# Patient Record
Sex: FEMALE | Race: WHITE | HISPANIC OR LATINO | Employment: OTHER | URBAN - METROPOLITAN AREA
[De-identification: names, ages, dates, MRNs, and addresses within clinical notes are randomized per-mention and may not be internally consistent; named-entity substitution may affect disease eponyms.]

---

## 2017-02-01 ENCOUNTER — HOSPITAL ENCOUNTER (OUTPATIENT)
Dept: ULTRASOUND IMAGING | Facility: CLINIC | Age: 67
Discharge: HOME | End: 2017-02-01
Payer: OTHER GOVERNMENT

## 2017-02-01 DIAGNOSIS — R92.8 ABNORMAL MAMMOGRAM, UNSPECIFIED: ICD-10-CM

## 2017-02-01 PROCEDURE — 76642 ULTRASOUND BREAST LIMITED: CPT

## 2017-09-14 ENCOUNTER — TRANSCRIBE ORDERS (OUTPATIENT)
Dept: ADMINISTRATIVE | Facility: HOSPITAL | Age: 67
End: 2017-09-14

## 2017-09-14 DIAGNOSIS — Z12.31 VISIT FOR SCREENING MAMMOGRAM: Primary | ICD-10-CM

## 2018-02-07 ENCOUNTER — HOSPITAL ENCOUNTER (OUTPATIENT)
Dept: RADIOLOGY | Facility: HOSPITAL | Age: 68
Discharge: HOME | End: 2018-02-07
Payer: OTHER GOVERNMENT

## 2018-02-07 DIAGNOSIS — Z12.31 VISIT FOR SCREENING MAMMOGRAM: ICD-10-CM

## 2018-02-07 PROCEDURE — 77067 SCR MAMMO BI INCL CAD: CPT

## 2019-01-25 ENCOUNTER — TRANSCRIBE ORDERS (OUTPATIENT)
Dept: ADMINISTRATIVE | Facility: HOSPITAL | Age: 69
End: 2019-01-25

## 2019-01-25 DIAGNOSIS — Z12.31 VISIT FOR SCREENING MAMMOGRAM: Primary | ICD-10-CM

## 2019-03-01 ENCOUNTER — OFFICE VISIT (OUTPATIENT)
Dept: GASTROENTEROLOGY | Facility: CLINIC | Age: 69
End: 2019-03-01
Payer: OTHER GOVERNMENT

## 2019-03-01 VITALS
WEIGHT: 168.8 LBS | SYSTOLIC BLOOD PRESSURE: 126 MMHG | HEART RATE: 66 BPM | BODY MASS INDEX: 35.43 KG/M2 | HEIGHT: 58 IN | DIASTOLIC BLOOD PRESSURE: 85 MMHG | TEMPERATURE: 97.3 F

## 2019-03-01 DIAGNOSIS — Z12.11 SCREENING FOR MALIGNANT NEOPLASM OF COLON: Primary | ICD-10-CM

## 2019-03-01 PROCEDURE — 99203 OFFICE O/P NEW LOW 30 MIN: CPT | Performed by: INTERNAL MEDICINE

## 2019-03-01 RX ORDER — IBUPROFEN 400 MG/1
TABLET ORAL
COMMUNITY

## 2019-03-01 RX ORDER — LATANOPROST 50 UG/ML
1 SOLUTION/ DROPS OPHTHALMIC
COMMUNITY
End: 2019-03-01 | Stop reason: CLARIF

## 2019-03-01 RX ORDER — BRIMONIDINE TARTRATE 2 MG/ML
1 SOLUTION/ DROPS OPHTHALMIC
COMMUNITY

## 2019-03-01 RX ORDER — ELETRIPTAN HYDROBROMIDE 20 MG/1
20 TABLET, FILM COATED ORAL
Status: ON HOLD | COMMUNITY
End: 2019-04-02 | Stop reason: CLARIF

## 2019-03-01 RX ORDER — ACETAMINOPHEN 500 MG
500 TABLET ORAL
COMMUNITY
End: 2022-06-30

## 2019-03-01 RX ORDER — SODIUM, POTASSIUM,MAG SULFATES 17.5-3.13G
180 SOLUTION, RECONSTITUTED, ORAL ORAL ONCE
Qty: 180 ML | Refills: 0 | Status: ON HOLD | OUTPATIENT
Start: 2019-03-01 | End: 2019-04-02 | Stop reason: CLARIF

## 2019-03-01 NOTE — PROGRESS NOTES
[facility] - Outpatient Consultation  Willene Snellen 80 y o  female MRN: [id number]  Encounter: [id number]          ASSESSMENT AND PLAN:    1  Screening for colon cancer  Will schedule for colonoscopy at [facility]  All risks/benefits were discussed including bleeding, infection, perforation, and missed lesions  _________________________________________    HPI:  Willene Snellen is a 80y o  year old female who presents to the office for colon cancer screening evaluation  She had her last colonoscopy over 10 years ago at the [facility] in [place], [place]  She recalls it being a negative exam     Health otherwise she feels well  Medications she takes eyedrops for ocular hypertension  Just takes Vitamin D3 and a multivitamin and b12  Has not had a colonoscopy in the past    No blood in stool  No weight loss  No family history of colon cancer  No change in bowel habits  Not on any blood thinners  REVIEW OF SYSTEMS:    CONSTITUTIONAL: Denies any fever, chills, rigors, and weight loss  HEENT: No earache or tinnitus  Denies hearing loss or visual disturbances  CARDIOVASCULAR: No chest pain or palpitations  RESPIRATORY: Denies any cough, hemoptysis, shortness of breath or dyspnea on exertion  GASTROINTESTINAL: As noted in the History of Present Illness  GENITOURINARY: No problems with urination  Denies any hematuria or dysuria  NEUROLOGIC: No dizziness or vertigo, denies headaches  MUSCULOSKELETAL: Denies any muscle or joint pain  SKIN: Denies skin rashes or itching  ENDOCRINE: Denies excessive thirst  Denies intolerance to heat or cold  PSYCHOSOCIAL: Denies depression or anxiety  Denies any recent memory loss         Historical Information   Past Medical History:   Diagnosis Date    Anemia     Hyperthyroidism     Lymphoma ([address] )     pt stated 5 years remission    Osteoporosis      Past Surgical History:   Procedure Laterality Date    HYSTERECTOMY Social History   Social History     Substance and Sexual Activity   Alcohol Use Yes    Comment: oscional     Social History     Substance and Sexual Activity   Drug Use Never     Social History     Tobacco Use   Smoking Status Never Smoker   Smokeless Tobacco Never Used     Family History   Problem Relation Age of Onset    Cancer Mother     Hypertension Mother     Lung cancer Father     Glaucoma Father        Meds/Allergies     No current outpatient medications on file  Allergies   Allergen Reactions    Adhesive [Medical Tape]      Pt stated      Darvon [Propoxyphene]      Pt stated      Listerine Healthy White Gentle [Sodium Fluoride]      Pt stated listerine             Objective     Blood pressure 126/85, pulse 66, temperature (!) 97 3 °F (36 3 °C), temperature source Tympanic, height 4' 10" (1 473 m), weight 76 6 kg (168 lb 12 8 oz)  Body mass index is 35 28 kg/m²  PHYSICAL EXAM:      General Appearance:   Alert, cooperative, no distress   HEENT:   Normocephalic, atraumatic, anicteric      Neck:  Supple, symmetrical, trachea midline   Lungs:   Clear to auscultation bilaterally; no rales, rhonchi or wheezing; respirations unlabored    Heart[de-identified]   Regular rate and rhythm; no murmur, rub, or gallop  Abdomen:   Soft, non-tender, non-distended; normal bowel sounds; no masses, no organomegaly    Genitalia:   Deferred    Rectal:   Deferred    Extremities:  No cyanosis, clubbing or edema    Pulses:  2+ and symmetric    Skin:  No jaundice, rashes, or lesions    Lymph nodes:  No palpable cervical lymphadenopathy        Lab Results:   No visits with results within 1 Day(s) from this visit  Latest known visit with results is:   No results found for any previous visit  Radiology Results:   No results found

## 2019-03-01 NOTE — PATIENT INSTRUCTIONS
Colon scheduled 4/2/19 at [facility] with Betariz Conway instructions given in office

## 2019-03-04 ENCOUNTER — TELEPHONE (OUTPATIENT)
Dept: GASTROENTEROLOGY | Facility: AMBULARY SURGERY CENTER | Age: 69
End: 2019-03-04

## 2019-03-04 NOTE — TELEPHONE ENCOUNTER
[facility] [phone] fax#    Pharmacy called because the pharmacy do not carry the suprep,only the golytely

## 2019-03-05 DIAGNOSIS — Z12.11 COLON CANCER SCREENING: Primary | ICD-10-CM

## 2019-03-05 NOTE — TELEPHONE ENCOUNTER
We are unable to send prescriptions to the [address] [facility], I tried and apparently cannot call prescriptions into the [place] pharmacy  Please ask patient if we can send this to a different pharmacy, otherwise she needs to get a printed script from us and bring it to her [place] PCP

## 2019-03-06 DIAGNOSIS — Z12.11 COLON CANCER SCREENING: ICD-10-CM

## 2019-03-14 ENCOUNTER — HOSPITAL ENCOUNTER (OUTPATIENT)
Dept: RADIOLOGY | Facility: HOSPITAL | Age: 69
Discharge: HOME | End: 2019-03-14
Payer: OTHER GOVERNMENT

## 2019-03-14 VITALS — WEIGHT: 168 LBS | HEIGHT: 58 IN | BODY MASS INDEX: 35.26 KG/M2

## 2019-03-14 DIAGNOSIS — Z12.31 VISIT FOR SCREENING MAMMOGRAM: ICD-10-CM

## 2019-03-14 PROCEDURE — 77067 SCR MAMMO BI INCL CAD: CPT

## 2019-03-18 ENCOUNTER — ANESTHESIA EVENT (OUTPATIENT)
Dept: PERIOP | Facility: AMBULARY SURGERY CENTER | Age: 69
End: 2019-03-18
Payer: OTHER GOVERNMENT

## 2019-03-26 ENCOUNTER — TELEPHONE (OUTPATIENT)
Dept: GASTROENTEROLOGY | Facility: MEDICAL CENTER | Age: 69
End: 2019-03-26

## 2019-04-02 ENCOUNTER — ANESTHESIA (OUTPATIENT)
Dept: PERIOP | Facility: AMBULARY SURGERY CENTER | Age: 69
End: 2019-04-02
Payer: OTHER GOVERNMENT

## 2019-04-02 ENCOUNTER — HOSPITAL ENCOUNTER (OUTPATIENT)
Facility: AMBULARY SURGERY CENTER | Age: 69
Setting detail: OUTPATIENT SURGERY
Discharge: HOME | End: 2019-04-02
Attending: INTERNAL MEDICINE | Admitting: INTERNAL MEDICINE
Payer: OTHER GOVERNMENT

## 2019-04-02 VITALS
OXYGEN SATURATION: 97 % | RESPIRATION RATE: 18 BRPM | WEIGHT: 160 LBS | TEMPERATURE: 97.6 F | SYSTOLIC BLOOD PRESSURE: 119 MMHG | HEIGHT: 58 IN | BODY MASS INDEX: 33.58 KG/M2 | DIASTOLIC BLOOD PRESSURE: 56 MMHG | HEART RATE: 62 BPM

## 2019-04-02 DIAGNOSIS — Z12.11 SCREENING FOR MALIGNANT NEOPLASM OF COLON: ICD-10-CM

## 2019-04-02 PROCEDURE — ERR1 ERRONEOUS ENCOUNTER-DISREGARD: Performed by: INTERNAL MEDICINE

## 2019-04-02 PROCEDURE — 45385 COLONOSCOPY W/LESION REMOVAL: CPT | Performed by: INTERNAL MEDICINE

## 2019-04-02 PROCEDURE — 88305 TISSUE EXAM BY PATHOLOGIST: CPT | Performed by: PATHOLOGY

## 2019-04-02 RX ORDER — SODIUM CHLORIDE, SODIUM LACTATE, POTASSIUM CHLORIDE, CALCIUM CHLORIDE 600; 310; 30; 20 MG/100ML; MG/100ML; MG/100ML; MG/100ML
125 INJECTION, SOLUTION INTRAVENOUS CONTINUOUS
Status: DISCONTINUED | OUTPATIENT
Start: 2019-04-02 | End: 2019-04-02 | Stop reason: HOSPADM

## 2019-04-02 RX ORDER — MAG HYDROX/ALUMINUM HYD/SIMETH 400-400-40
1 SUSPENSION, ORAL (FINAL DOSE FORM) ORAL DAILY
COMMUNITY

## 2019-04-02 RX ORDER — PROPOFOL 10 MG/ML
INJECTION, EMULSION INTRAVENOUS AS NEEDED
Status: DISCONTINUED | OUTPATIENT
Start: 2019-04-02 | End: 2019-04-02 | Stop reason: SURG

## 2019-04-02 RX ORDER — MEPERIDINE HYDROCHLORIDE 25 MG/ML
12.5 INJECTION INTRAMUSCULAR; INTRAVENOUS; SUBCUTANEOUS AS NEEDED
Status: DISCONTINUED | OUTPATIENT
Start: 2019-04-02 | End: 2019-04-02 | Stop reason: HOSPADM

## 2019-04-02 RX ORDER — ALBUTEROL SULFATE 2.5 MG/3ML
2.5 SOLUTION RESPIRATORY (INHALATION) ONCE AS NEEDED
Status: DISCONTINUED | OUTPATIENT
Start: 2019-04-02 | End: 2019-04-02 | Stop reason: HOSPADM

## 2019-04-02 RX ORDER — DIPHENOXYLATE HYDROCHLORIDE AND ATROPINE SULFATE 2.5; .025 MG/1; MG/1
1 TABLET ORAL DAILY
COMMUNITY

## 2019-04-02 RX ORDER — SODIUM CHLORIDE 9 MG/ML
INJECTION, SOLUTION INTRAVENOUS CONTINUOUS PRN
Status: DISCONTINUED | OUTPATIENT
Start: 2019-04-02 | End: 2019-04-02 | Stop reason: SURG

## 2019-04-02 RX ORDER — ONDANSETRON 2 MG/ML
4 INJECTION INTRAMUSCULAR; INTRAVENOUS ONCE AS NEEDED
Status: DISCONTINUED | OUTPATIENT
Start: 2019-04-02 | End: 2019-04-02 | Stop reason: HOSPADM

## 2019-04-02 RX ADMIN — PROPOFOL 50 MG: 10 INJECTION, EMULSION INTRAVENOUS at 08:57

## 2019-04-02 RX ADMIN — PROPOFOL 50 MG: 10 INJECTION, EMULSION INTRAVENOUS at 09:02

## 2019-04-02 RX ADMIN — SODIUM CHLORIDE: 0.9 INJECTION, SOLUTION INTRAVENOUS at 08:27

## 2019-04-02 RX ADMIN — PROPOFOL 50 MG: 10 INJECTION, EMULSION INTRAVENOUS at 08:54

## 2020-03-09 ENCOUNTER — TRANSCRIBE ORDERS (OUTPATIENT)
Dept: ADMINISTRATIVE | Facility: HOSPITAL | Age: 70
End: 2020-03-09

## 2020-03-09 DIAGNOSIS — Z12.31 ENCOUNTER FOR SCREENING MAMMOGRAM FOR MALIGNANT NEOPLASM OF BREAST: Primary | ICD-10-CM

## 2020-07-16 ENCOUNTER — HOSPITAL ENCOUNTER (OUTPATIENT)
Dept: RADIOLOGY | Age: 70
Discharge: HOME | End: 2020-07-16
Payer: COMMERCIAL

## 2020-07-16 VITALS — BODY MASS INDEX: 35.26 KG/M2 | HEIGHT: 58 IN | WEIGHT: 168 LBS

## 2020-07-16 DIAGNOSIS — Z12.31 ENCOUNTER FOR SCREENING MAMMOGRAM FOR MALIGNANT NEOPLASM OF BREAST: ICD-10-CM

## 2020-07-16 PROCEDURE — 77067 SCR MAMMO BI INCL CAD: CPT

## 2020-07-16 PROCEDURE — 77063 BREAST TOMOSYNTHESIS BI: CPT

## 2020-07-22 ENCOUNTER — TRANSCRIBE ORDERS (OUTPATIENT)
Dept: ADMINISTRATIVE | Facility: HOSPITAL | Age: 70
End: 2020-07-22

## 2021-12-27 ENCOUNTER — HOSPITAL ENCOUNTER (OUTPATIENT)
Dept: RADIOLOGY | Age: 71
Discharge: HOME | End: 2021-12-27
Payer: COMMERCIAL

## 2021-12-27 VITALS — WEIGHT: 164 LBS | HEIGHT: 58 IN | BODY MASS INDEX: 34.43 KG/M2

## 2021-12-27 DIAGNOSIS — Z12.31 ENCOUNTER FOR SCREENING MAMMOGRAM FOR MALIGNANT NEOPLASM OF BREAST: ICD-10-CM

## 2021-12-27 PROCEDURE — 77067 SCR MAMMO BI INCL CAD: CPT

## 2021-12-27 PROCEDURE — 77063 BREAST TOMOSYNTHESIS BI: CPT

## 2022-06-27 ENCOUNTER — ANESTHESIA (INPATIENT)
Dept: PERIOP | Facility: HOSPITAL | Age: 72
DRG: 418 | End: 2022-06-27
Payer: COMMERCIAL

## 2022-06-27 ENCOUNTER — APPOINTMENT (INPATIENT)
Dept: RADIOLOGY | Facility: HOSPITAL | Age: 72
DRG: 418 | End: 2022-06-27
Payer: COMMERCIAL

## 2022-06-27 ENCOUNTER — ANESTHESIA EVENT (INPATIENT)
Dept: PERIOP | Facility: HOSPITAL | Age: 72
DRG: 418 | End: 2022-06-27
Payer: COMMERCIAL

## 2022-06-27 ENCOUNTER — APPOINTMENT (EMERGENCY)
Dept: ULTRASOUND IMAGING | Facility: HOSPITAL | Age: 72
DRG: 418 | End: 2022-06-27
Payer: COMMERCIAL

## 2022-06-27 ENCOUNTER — HOSPITAL ENCOUNTER (INPATIENT)
Facility: HOSPITAL | Age: 72
LOS: 3 days | Discharge: HOME | DRG: 418 | End: 2022-06-30
Attending: EMERGENCY MEDICINE | Admitting: SURGERY
Payer: COMMERCIAL

## 2022-06-27 ENCOUNTER — APPOINTMENT (EMERGENCY)
Dept: CT IMAGING | Facility: HOSPITAL | Age: 72
DRG: 418 | End: 2022-06-27
Payer: COMMERCIAL

## 2022-06-27 DIAGNOSIS — R10.9 ABDOMINAL PAIN: Primary | ICD-10-CM

## 2022-06-27 DIAGNOSIS — K81.9 CHOLECYSTITIS: ICD-10-CM

## 2022-06-27 DIAGNOSIS — K81.0 ACUTE CHOLECYSTITIS: ICD-10-CM

## 2022-06-27 DIAGNOSIS — K80.50 CHOLEDOCHOLITHIASIS: ICD-10-CM

## 2022-06-27 LAB
ALBUMIN SERPL BCP-MCNC: 4.2 G/DL (ref 3.5–5)
ALP SERPL-CCNC: 66 U/L (ref 34–104)
ALT SERPL W P-5'-P-CCNC: 18 U/L (ref 7–52)
ANION GAP SERPL CALCULATED.3IONS-SCNC: 16 MMOL/L (ref 4–13)
AST SERPL W P-5'-P-CCNC: 18 U/L (ref 13–39)
BASOPHILS # BLD AUTO: 0.1 THOUSANDS/ΜL (ref 0–0.1)
BASOPHILS NFR BLD AUTO: 0 % (ref 0–1)
BILIRUB SERPL-MCNC: 1.61 MG/DL (ref 0.2–1)
BUN SERPL-MCNC: 19 MG/DL (ref 5–25)
CALCIUM SERPL-MCNC: 9.5 MG/DL (ref 8.4–10.2)
CHLORIDE SERPL-SCNC: 102 MMOL/L (ref 96–108)
CO2 SERPL-SCNC: 19 MMOL/L (ref 21–32)
CREAT SERPL-MCNC: 0.75 MG/DL (ref 0.6–1.3)
EOSINOPHIL # BLD AUTO: 0.01 THOUSAND/ΜL (ref 0–0.61)
EOSINOPHIL NFR BLD AUTO: 0 % (ref 0–6)
ERYTHROCYTE [DISTWIDTH] IN BLOOD BY AUTOMATED COUNT: 14.2 % (ref 11.6–15.1)
GFR SERPL CREATININE-BSD FRML MDRD: 81 ML/MIN/1.73SQ M
GLUCOSE SERPL-MCNC: 140 MG/DL (ref 65–140)
HCT VFR BLD AUTO: 48.7 % (ref 34.8–46.1)
HGB BLD-MCNC: 16.2 G/DL (ref 11.5–15.4)
HOLD SPECIMEN: NORMAL
IMM GRANULOCYTES # BLD AUTO: 0.17 THOUSAND/UL (ref 0–0.2)
IMM GRANULOCYTES NFR BLD AUTO: 1 % (ref 0–2)
LIPASE SERPL-CCNC: <6 U/L (ref 11–82)
LYMPHOCYTES # BLD AUTO: 1.75 THOUSANDS/ΜL (ref 0.6–4.47)
LYMPHOCYTES NFR BLD AUTO: 7 % (ref 14–44)
MCH RBC QN AUTO: 29.8 PG (ref 26.8–34.3)
MCHC RBC AUTO-ENTMCNC: 33.3 G/DL (ref 31.4–37.4)
MCV RBC AUTO: 90 FL (ref 82–98)
MONOCYTES # BLD AUTO: 2.39 THOUSAND/ΜL (ref 0.17–1.22)
MONOCYTES NFR BLD AUTO: 10 % (ref 4–12)
NEUTROPHILS # BLD AUTO: 19.87 THOUSANDS/ΜL (ref 1.85–7.62)
NEUTS SEG NFR BLD AUTO: 82 % (ref 43–75)
NRBC BLD AUTO-RTO: 0 /100 WBCS
PLATELET # BLD AUTO: 336 THOUSANDS/UL (ref 149–390)
PMV BLD AUTO: 11.3 FL (ref 8.9–12.7)
POTASSIUM SERPL-SCNC: 3.8 MMOL/L (ref 3.5–5.3)
PROT SERPL-MCNC: 8 G/DL (ref 6.4–8.4)
RBC # BLD AUTO: 5.43 MILLION/UL (ref 3.81–5.12)
SODIUM SERPL-SCNC: 137 MMOL/L (ref 135–147)
WBC # BLD AUTO: 24.29 THOUSAND/UL (ref 4.31–10.16)

## 2022-06-27 PROCEDURE — 83690 ASSAY OF LIPASE: CPT | Performed by: EMERGENCY MEDICINE

## 2022-06-27 PROCEDURE — 99285 EMERGENCY DEPT VISIT HI MDM: CPT | Performed by: EMERGENCY MEDICINE

## 2022-06-27 PROCEDURE — 0FT44ZZ RESECTION OF GALLBLADDER, PERCUTANEOUS ENDOSCOPIC APPROACH: ICD-10-PCS | Performed by: SURGERY

## 2022-06-27 PROCEDURE — 88304 TISSUE EXAM BY PATHOLOGIST: CPT | Performed by: PATHOLOGY

## 2022-06-27 PROCEDURE — 36415 COLL VENOUS BLD VENIPUNCTURE: CPT

## 2022-06-27 PROCEDURE — 47562 LAPAROSCOPIC CHOLECYSTECTOMY: CPT | Performed by: SURGERY

## 2022-06-27 PROCEDURE — 76705 ECHO EXAM OF ABDOMEN: CPT

## 2022-06-27 PROCEDURE — G1004 CDSM NDSC: HCPCS

## 2022-06-27 PROCEDURE — 71045 X-RAY EXAM CHEST 1 VIEW: CPT

## 2022-06-27 PROCEDURE — 80053 COMPREHEN METABOLIC PANEL: CPT | Performed by: EMERGENCY MEDICINE

## 2022-06-27 PROCEDURE — 85025 COMPLETE CBC W/AUTO DIFF WBC: CPT | Performed by: EMERGENCY MEDICINE

## 2022-06-27 PROCEDURE — 96360 HYDRATION IV INFUSION INIT: CPT

## 2022-06-27 PROCEDURE — 99285 EMERGENCY DEPT VISIT HI MDM: CPT

## 2022-06-27 PROCEDURE — 99223 1ST HOSP IP/OBS HIGH 75: CPT | Performed by: SURGERY

## 2022-06-27 PROCEDURE — 74176 CT ABD & PELVIS W/O CONTRAST: CPT

## 2022-06-27 RX ORDER — ROCURONIUM BROMIDE 10 MG/ML
INJECTION, SOLUTION INTRAVENOUS AS NEEDED
Status: DISCONTINUED | OUTPATIENT
Start: 2022-06-27 | End: 2022-06-27

## 2022-06-27 RX ORDER — MEPERIDINE HYDROCHLORIDE 25 MG/ML
12.5 INJECTION INTRAMUSCULAR; INTRAVENOUS; SUBCUTANEOUS
Status: DISCONTINUED | OUTPATIENT
Start: 2022-06-27 | End: 2022-06-27

## 2022-06-27 RX ORDER — BUPIVACAINE HYDROCHLORIDE 2.5 MG/ML
INJECTION, SOLUTION EPIDURAL; INFILTRATION; INTRACAUDAL AS NEEDED
Status: DISCONTINUED | OUTPATIENT
Start: 2022-06-27 | End: 2022-06-27 | Stop reason: HOSPADM

## 2022-06-27 RX ORDER — DORZOLAMIDE HYDROCHLORIDE AND TIMOLOL MALEATE 20; 5 MG/ML; MG/ML
1 SOLUTION/ DROPS OPHTHALMIC 2 TIMES DAILY
Status: DISCONTINUED | OUTPATIENT
Start: 2022-06-27 | End: 2022-06-30 | Stop reason: HOSPADM

## 2022-06-27 RX ORDER — HYDROMORPHONE HCL IN WATER/PF 6 MG/30 ML
0.2 PATIENT CONTROLLED ANALGESIA SYRINGE INTRAVENOUS EVERY 4 HOURS PRN
Status: DISCONTINUED | OUTPATIENT
Start: 2022-06-27 | End: 2022-06-27

## 2022-06-27 RX ORDER — DEXAMETHASONE SODIUM PHOSPHATE 10 MG/ML
INJECTION, SOLUTION INTRAMUSCULAR; INTRAVENOUS AS NEEDED
Status: DISCONTINUED | OUTPATIENT
Start: 2022-06-27 | End: 2022-06-27

## 2022-06-27 RX ORDER — PROMETHAZINE HYDROCHLORIDE 25 MG/ML
12.5 INJECTION, SOLUTION INTRAMUSCULAR; INTRAVENOUS ONCE AS NEEDED
Status: DISCONTINUED | OUTPATIENT
Start: 2022-06-27 | End: 2022-06-27

## 2022-06-27 RX ORDER — HYDROMORPHONE HCL/PF 1 MG/ML
0.5 SYRINGE (ML) INJECTION
Status: DISCONTINUED | OUTPATIENT
Start: 2022-06-27 | End: 2022-06-27

## 2022-06-27 RX ORDER — METRONIDAZOLE 500 MG/100ML
500 INJECTION, SOLUTION INTRAVENOUS EVERY 8 HOURS
Status: DISCONTINUED | OUTPATIENT
Start: 2022-06-27 | End: 2022-06-27

## 2022-06-27 RX ORDER — INSULIN LISPRO 100 [IU]/ML
1-5 INJECTION, SOLUTION INTRAVENOUS; SUBCUTANEOUS
Status: DISCONTINUED | OUTPATIENT
Start: 2022-06-28 | End: 2022-06-30 | Stop reason: HOSPADM

## 2022-06-27 RX ORDER — ONDANSETRON 2 MG/ML
4 INJECTION INTRAMUSCULAR; INTRAVENOUS ONCE AS NEEDED
Status: DISCONTINUED | OUTPATIENT
Start: 2022-06-27 | End: 2022-06-27

## 2022-06-27 RX ORDER — PROPOFOL 10 MG/ML
INJECTION, EMULSION INTRAVENOUS CONTINUOUS PRN
Status: DISCONTINUED | OUTPATIENT
Start: 2022-06-27 | End: 2022-06-27

## 2022-06-27 RX ORDER — MAGNESIUM HYDROXIDE 1200 MG/15ML
LIQUID ORAL AS NEEDED
Status: DISCONTINUED | OUTPATIENT
Start: 2022-06-27 | End: 2022-06-27 | Stop reason: HOSPADM

## 2022-06-27 RX ORDER — HEPARIN SODIUM 5000 [USP'U]/ML
5000 INJECTION, SOLUTION INTRAVENOUS; SUBCUTANEOUS EVERY 8 HOURS SCHEDULED
Status: DISCONTINUED | OUTPATIENT
Start: 2022-06-27 | End: 2022-06-30 | Stop reason: HOSPADM

## 2022-06-27 RX ORDER — HYDROMORPHONE HCL/PF 1 MG/ML
SYRINGE (ML) INJECTION AS NEEDED
Status: DISCONTINUED | OUTPATIENT
Start: 2022-06-27 | End: 2022-06-27

## 2022-06-27 RX ORDER — LIDOCAINE HYDROCHLORIDE 10 MG/ML
INJECTION, SOLUTION EPIDURAL; INFILTRATION; INTRACAUDAL; PERINEURAL AS NEEDED
Status: DISCONTINUED | OUTPATIENT
Start: 2022-06-27 | End: 2022-06-27

## 2022-06-27 RX ORDER — PROPOFOL 10 MG/ML
INJECTION, EMULSION INTRAVENOUS AS NEEDED
Status: DISCONTINUED | OUTPATIENT
Start: 2022-06-27 | End: 2022-06-27

## 2022-06-27 RX ORDER — CEFAZOLIN SODIUM 2 G/50ML
2000 SOLUTION INTRAVENOUS EVERY 8 HOURS
Status: DISCONTINUED | OUTPATIENT
Start: 2022-06-27 | End: 2022-06-27

## 2022-06-27 RX ORDER — ONDANSETRON 2 MG/ML
4 INJECTION INTRAMUSCULAR; INTRAVENOUS EVERY 6 HOURS PRN
Status: DISCONTINUED | OUTPATIENT
Start: 2022-06-27 | End: 2022-06-30 | Stop reason: HOSPADM

## 2022-06-27 RX ORDER — OXYCODONE HYDROCHLORIDE 10 MG/1
10 TABLET ORAL EVERY 4 HOURS PRN
Status: DISCONTINUED | OUTPATIENT
Start: 2022-06-27 | End: 2022-06-30 | Stop reason: HOSPADM

## 2022-06-27 RX ORDER — HYDROMORPHONE HCL/PF 1 MG/ML
0.5 SYRINGE (ML) INJECTION EVERY 4 HOURS PRN
Status: DISCONTINUED | OUTPATIENT
Start: 2022-06-27 | End: 2022-06-30 | Stop reason: HOSPADM

## 2022-06-27 RX ORDER — CEFAZOLIN SODIUM 2 G/50ML
2000 SOLUTION INTRAVENOUS EVERY 8 HOURS
Status: DISCONTINUED | OUTPATIENT
Start: 2022-06-27 | End: 2022-06-28

## 2022-06-27 RX ORDER — OXYCODONE HYDROCHLORIDE 5 MG/1
5 TABLET ORAL EVERY 4 HOURS PRN
Status: DISCONTINUED | OUTPATIENT
Start: 2022-06-27 | End: 2022-06-30 | Stop reason: HOSPADM

## 2022-06-27 RX ORDER — ALBUTEROL SULFATE 2.5 MG/3ML
2.5 SOLUTION RESPIRATORY (INHALATION) ONCE AS NEEDED
Status: DISCONTINUED | OUTPATIENT
Start: 2022-06-27 | End: 2022-06-27

## 2022-06-27 RX ORDER — SUCCINYLCHOLINE/SOD CL,ISO/PF 100 MG/5ML
SYRINGE (ML) INTRAVENOUS AS NEEDED
Status: DISCONTINUED | OUTPATIENT
Start: 2022-06-27 | End: 2022-06-27

## 2022-06-27 RX ORDER — ACETAMINOPHEN 325 MG/1
975 TABLET ORAL EVERY 8 HOURS SCHEDULED
Status: DISCONTINUED | OUTPATIENT
Start: 2022-06-27 | End: 2022-06-30 | Stop reason: HOSPADM

## 2022-06-27 RX ORDER — ONDANSETRON 2 MG/ML
INJECTION INTRAMUSCULAR; INTRAVENOUS AS NEEDED
Status: DISCONTINUED | OUTPATIENT
Start: 2022-06-27 | End: 2022-06-27

## 2022-06-27 RX ORDER — BRIMONIDINE TARTRATE 2 MG/ML
1 SOLUTION/ DROPS OPHTHALMIC EVERY 8 HOURS SCHEDULED
Status: DISCONTINUED | OUTPATIENT
Start: 2022-06-27 | End: 2022-06-30 | Stop reason: HOSPADM

## 2022-06-27 RX ORDER — FENTANYL CITRATE 50 UG/ML
INJECTION, SOLUTION INTRAMUSCULAR; INTRAVENOUS AS NEEDED
Status: DISCONTINUED | OUTPATIENT
Start: 2022-06-27 | End: 2022-06-27

## 2022-06-27 RX ORDER — FENTANYL CITRATE/PF 50 MCG/ML
25 SYRINGE (ML) INJECTION
Status: DISCONTINUED | OUTPATIENT
Start: 2022-06-27 | End: 2022-06-27

## 2022-06-27 RX ORDER — METRONIDAZOLE 500 MG/100ML
500 INJECTION, SOLUTION INTRAVENOUS EVERY 8 HOURS
Status: DISCONTINUED | OUTPATIENT
Start: 2022-06-27 | End: 2022-06-28

## 2022-06-27 RX ORDER — LEVOTHYROXINE SODIUM 0.05 MG/1
50 TABLET ORAL
Status: DISCONTINUED | OUTPATIENT
Start: 2022-06-28 | End: 2022-06-30 | Stop reason: HOSPADM

## 2022-06-27 RX ORDER — SODIUM CHLORIDE, SODIUM LACTATE, POTASSIUM CHLORIDE, CALCIUM CHLORIDE 600; 310; 30; 20 MG/100ML; MG/100ML; MG/100ML; MG/100ML
50 INJECTION, SOLUTION INTRAVENOUS CONTINUOUS
Status: DISCONTINUED | OUTPATIENT
Start: 2022-06-27 | End: 2022-06-29

## 2022-06-27 RX ORDER — SODIUM CHLORIDE, SODIUM LACTATE, POTASSIUM CHLORIDE, CALCIUM CHLORIDE 600; 310; 30; 20 MG/100ML; MG/100ML; MG/100ML; MG/100ML
INJECTION, SOLUTION INTRAVENOUS CONTINUOUS PRN
Status: DISCONTINUED | OUTPATIENT
Start: 2022-06-27 | End: 2022-06-27

## 2022-06-27 RX ADMIN — CEFAZOLIN SODIUM 2000 MG: 2 SOLUTION INTRAVENOUS at 23:37

## 2022-06-27 RX ADMIN — ACETAMINOPHEN 975 MG: 325 TABLET, FILM COATED ORAL at 21:18

## 2022-06-27 RX ADMIN — SODIUM CHLORIDE, SODIUM LACTATE, POTASSIUM CHLORIDE, AND CALCIUM CHLORIDE 125 ML/HR: .6; .31; .03; .02 INJECTION, SOLUTION INTRAVENOUS at 23:27

## 2022-06-27 RX ADMIN — DORZOLAMIDE HYDROCHLORIDE AND TIMOLOL MALEATE 1 DROP: 20; 5 SOLUTION/ DROPS OPHTHALMIC at 21:15

## 2022-06-27 RX ADMIN — BRIMONIDINE TARTRATE 1 DROP: 2 SOLUTION/ DROPS OPHTHALMIC at 22:00

## 2022-06-27 RX ADMIN — METRONIDAZOLE: 500 INJECTION, SOLUTION INTRAVENOUS at 17:15

## 2022-06-27 RX ADMIN — LIDOCAINE HYDROCHLORIDE 50 MG: 10 INJECTION, SOLUTION EPIDURAL; INFILTRATION; INTRACAUDAL at 17:07

## 2022-06-27 RX ADMIN — PROPOFOL 130 MG: 10 INJECTION, EMULSION INTRAVENOUS at 17:07

## 2022-06-27 RX ADMIN — PROPOFOL 80 MCG/KG/MIN: 10 INJECTION, EMULSION INTRAVENOUS at 17:12

## 2022-06-27 RX ADMIN — SUGAMMADEX 200 MG: 100 INJECTION, SOLUTION INTRAVENOUS at 19:19

## 2022-06-27 RX ADMIN — ROCURONIUM BROMIDE 15 MG: 10 INJECTION, SOLUTION INTRAVENOUS at 18:51

## 2022-06-27 RX ADMIN — ROCURONIUM BROMIDE 50 MG: 10 INJECTION, SOLUTION INTRAVENOUS at 17:12

## 2022-06-27 RX ADMIN — SODIUM CHLORIDE, SODIUM LACTATE, POTASSIUM CHLORIDE, AND CALCIUM CHLORIDE: .6; .31; .03; .02 INJECTION, SOLUTION INTRAVENOUS at 17:04

## 2022-06-27 RX ADMIN — PHENYLEPHRINE HYDROCHLORIDE 40 MCG/MIN: 10 INJECTION INTRAVENOUS at 17:12

## 2022-06-27 RX ADMIN — SODIUM CHLORIDE 1000 ML: 0.9 INJECTION, SOLUTION INTRAVENOUS at 12:18

## 2022-06-27 RX ADMIN — HEPARIN SODIUM 5000 UNITS: 5000 INJECTION INTRAVENOUS; SUBCUTANEOUS at 21:20

## 2022-06-27 RX ADMIN — HYDROMORPHONE HYDROCHLORIDE 0.5 MG: 1 INJECTION, SOLUTION INTRAMUSCULAR; INTRAVENOUS; SUBCUTANEOUS at 18:29

## 2022-06-27 RX ADMIN — DEXAMETHASONE SODIUM PHOSPHATE 10 MG: 10 INJECTION, SOLUTION INTRAMUSCULAR; INTRAVENOUS at 17:12

## 2022-06-27 RX ADMIN — ONDANSETRON 4 MG: 2 INJECTION INTRAMUSCULAR; INTRAVENOUS at 17:07

## 2022-06-27 RX ADMIN — Medication 100 MG: at 17:07

## 2022-06-27 RX ADMIN — CEFAZOLIN SODIUM 2000 MG: 2 SOLUTION INTRAVENOUS at 17:15

## 2022-06-27 RX ADMIN — FENTANYL CITRATE 100 MCG: 50 INJECTION, SOLUTION INTRAMUSCULAR; INTRAVENOUS at 17:07

## 2022-06-27 RX ADMIN — SODIUM CHLORIDE, SODIUM LACTATE, POTASSIUM CHLORIDE, AND CALCIUM CHLORIDE: .6; .31; .03; .02 INJECTION, SOLUTION INTRAVENOUS at 17:56

## 2022-06-27 RX ADMIN — HYDROMORPHONE HYDROCHLORIDE 0.5 MG: 1 INJECTION, SOLUTION INTRAMUSCULAR; INTRAVENOUS; SUBCUTANEOUS at 19:21

## 2022-06-27 NOTE — H&P
H&P Exam - Acute Care Surgery   Javier Lowery 80 y o  female MRN: [id number]  Unit/Bed#: [place] [address] Encounter: [id number]    Assessment/Plan     Assessment:  71-year-old female with 2 days of abdominal pain consistent w acute cholecystitis  Vss  Afebrile  Abdomen is severe tenderness in the right upper quadrant  Pertinent labs  WBC 24  AST/ALT 18/18  Alkaline phosphatase 66  Total bilirubin 1 6    Plan:  Admit to surgery service  IV antibiotics Ancef/Flagyl  IV fluids, lactated Ringer's at 125cc/h  Pain control  To operating room for laparoscopic cholecystectomy pending that there are no common bile duct stones on right upper quadrant ultrasound    History of Present Illness   History, ROS and PFSH unobtainable from any source due to none  HPI:  Javier Lowery is a 80 y o  female past medical history of lymphoma, prediabetes, anemia, hyperlipidemia surgical history of total abdominal hysterectomy oophorectomy, appendectomy, 30 years ago, who presents with remote history of biliary colic  However patient reports that she has developed severe sharp right upper quadrant pain that is been persistent for the past 2 days  Movement makes the pain worse  She has not eaten in 2 days  Endorsed intermittent fever and chills  Denied any current chest pain or shortness of breath  Denied history of MI or stroke  She does not take any anticoagulation or anti-platelet  Denied history of blood clots  Review of Systems   Constitutional: Positive for chills and fever  HENT: Negative  Eyes: Positive for discharge  Respiratory: Negative  Cardiovascular: Negative  Gastrointestinal: Negative  Endocrine: Negative  Genitourinary: Negative  Musculoskeletal: Negative  Skin: Negative  Allergic/Immunologic: Negative  Neurological: Negative  Hematological: Negative  Psychiatric/Behavioral: Negative          Historical Information   Past Medical History:   Diagnosis Date    Abnormal results of thyroid function studies     Age-related osteoporosis without current pathological fracture     Anemia     Diffuse large B-cell lymphoma (HCC)     diffuse large B-cell lymphoma unspecified sit    Edema     History of chemotherapy 10/01/2013    Hyperthyroidism     Impaired fasting glucose     Lymphoma ([facility] )     pt stated 5 years remission    Mixed hyperlipidemia     Non Hodgkin's lymphoma ([facility] ) 07/01/2013    unspecified, unspecified sit    Nuclear age-related cataract, both eyes     Obesity     Ocular hypertension, bilateral     Prediabetes     Puckering of macula, left eye     Tremor     unspecified     Past Surgical History:   Procedure Laterality Date    APPENDECTOMY      HIP FRACTURE SURGERY      HYSTERECTOMY      @ age 40    OOPHORECTOMY      @age 40    CA COLONOSCOPY FLX DX W/COLLJ SPEC WHEN PFRMD N/A 4/2/2019    Procedure: COLONOSCOPY;  Surgeon: Eva Young MD;  Location: [facility]; Service: Gastroenterology     Social History   Social History     Substance and Sexual Activity   Alcohol Use Yes    Comment: oscional     Social History     Substance and Sexual Activity   Drug Use Never     Social History     Tobacco Use   Smoking Status Never Smoker   Smokeless Tobacco Never Used     E-Cigarette/Vaping     E-Cigarette/Vaping Substances     Family History: non-contributory    Meds/Allergies   all current active meds have been reviewed  Allergies   Allergen Reactions    Adhesive [Medical Tape]      Pt stated      Darvon [Propoxyphene]      Pt stated      Listerine Antiseptic [Antiseptic Mouth Rinse]     Listerine Healthy White Gentle [Sodium Fluoride]      Pt stated listerine, listermint, with fluoride           Objective   Vitals: Blood pressure 121/75, pulse 89, temperature 98 1 °F (36 7 °C), temperature source Oral, resp  rate 16, height 4' 10" (1 473 m), weight 72 6 kg (160 lb), SpO2 97 %, not currently breastfeeding  ,Body mass index is 33 44 kg/m²    No intake or output data in the 24 hours ending 06/27/22 1542  Invasive Devices  Report    Peripheral Intravenous Line  Duration           Peripheral IV 06/27/22 Left Antecubital <1 day                Physical Exam  Vitals reviewed  Constitutional:       Appearance: Normal appearance  HENT:      Head: Normocephalic and atraumatic  Nose: Nose normal       Mouth/Throat:      Mouth: Mucous membranes are moist    Eyes:      Pupils: Pupils are equal, round, and reactive to light  Cardiovascular:      Rate and Rhythm: Normal rate  Pulses: Normal pulses  Pulmonary:      Effort: Pulmonary effort is normal    Abdominal:      General: There is distension  Palpations: Abdomen is soft  Tenderness: There is abdominal tenderness  There is no guarding  Genitourinary:     Comments: deferred  Musculoskeletal:         General: Normal range of motion  Cervical back: Normal range of motion  Skin:     General: Skin is warm  Capillary Refill: Capillary refill takes 2 to 3 seconds  Neurological:      Mental Status: She is alert  Psychiatric:         Mood and Affect: Mood normal          Lab Results:   I have personally reviewed pertinent reports    , Coags: No results found for: PT, PTT, INR, Creatinine:   Lab Results   Component Value Date    CREATININE 0 75 06/27/2022   , Lipid Panel: No results found for: CHOL, CBC with diff:   Lab Results   Component Value Date    WBC 24 29 (H) 06/27/2022    HGB 16 2 (H) 06/27/2022    HCT 48 7 (H) 06/27/2022    MCV 90 06/27/2022     06/27/2022    MCH 29 8 06/27/2022    MCHC 33 3 06/27/2022    RDW 14 2 06/27/2022    MPV 11 3 06/27/2022    NRBC 0 06/27/2022   , BMP/CMP:   Lab Results   Component Value Date    SODIUM 137 06/27/2022    K 3 8 06/27/2022     06/27/2022    CO2 19 (L) 06/27/2022    BUN 19 06/27/2022    CREATININE 0 75 06/27/2022    CALCIUM 9 5 06/27/2022    AST 18 06/27/2022    ALT 18 06/27/2022    ALKPHOS 66 06/27/2022    EGFR 81 06/27/2022   , Coags: No results found for: PT, PTT, INR, CRP: No results found for: CRP  Imaging: I have personally reviewed pertinent reports  EKG, Pathology, and Other Studies: I have personally reviewed pertinent reports  Code Status: Level 1 - Full Code  Advance Directive and Living Will:      Power of :    POLST:      Counseling / Coordination of Care  Counseling/Coordination of Care: Total floor / unit time spent today 30 minutes  Greater than 50% of total time was spent with the patient and / or family counseling and / or coordination of care   A description of the counseling / coordination of care: 30

## 2022-06-27 NOTE — ED PROVIDER NOTES
History  Chief Complaint   Patient presents with    Abdominal Pain     Pt reports lower abd pain x3 days, states started in RUQ but now only in b/l lower abd  Worse when laying on stomach  Denies NVD/fevers      HPI     67-year-old female presents emergency department for evaluation of right upper quadrant pain that has now extended to her lower abdomen  Worse when lying flat  Denies any nausea, vomiting, diarrhea  Denies any fever  Denies any dysuria  Reports history of hysterectomy, denies additional intra-abdominal operation  Reports diminished oral intake over the past several days  Prior to Admission Medications   Prescriptions Last Dose Informant Patient Reported? Taking?    B Complex Vitamins (VITAMIN B COMPLEX PO)  Outside Facility (Specify) Yes No   Sig: Take by mouth   Cholecalciferol (VITAMIN D3) 5000 units CAPS   Yes No   Sig: Take 1 capsule by mouth daily   Denosumab (PROLIA SC)   Yes No   Sig: Inject under the skin   Dorzolamide HCl-Timolol Mal PF 22 3-6 8 MG/ML SOLN  Outside Facility (Specify) Yes No   Sig: Apply to eye   GLUCOSAMINE-CHONDROITIN-MSM PO  Outside Facility (Specify) Yes No   Sig: Take by mouth   Levothyroxine Sodium (SYNTHROID PO)  Outside Facility (Specify) Yes No   Sig: Take 0 05 mg by mouth   acetaminophen (TYLENOL) 500 mg tablet  Outside Facility (Specify) Yes No   Sig: Take 500 mg by mouth   brimonidine tartrate 0 2 % ophthalmic solution  Outside Facility (Specify) Yes No   Si drop   ibuprofen (MOTRIN) 400 mg tablet  Outside Facility (Specify) Yes No   Sig: Take by mouth   multivitamin (THERAGRAN) TABS   Yes No   Sig: Take 1 tablet by mouth daily      Facility-Administered Medications: None       Past Medical History:   Diagnosis Date    Abnormal results of thyroid function studies     Age-related osteoporosis without current pathological fracture     Anemia     Diffuse large B-cell lymphoma (HCC)     diffuse large B-cell lymphoma unspecified sit    Edema     History of chemotherapy 10/01/2013    Hyperthyroidism     Impaired fasting glucose     [facility] )     pt stated 5 years remission    Mixed hyperlipidemia     Non Hodgkin's lymphoma ([facility] ) 07/01/2013    unspecified, unspecified sit    Nuclear age-related cataract, both eyes     Obesity     Ocular hypertension, bilateral     Prediabetes     Puckering of macula, left eye     Tremor     unspecified       Past Surgical History:   Procedure Laterality Date    APPENDECTOMY      HIP FRACTURE SURGERY      HYSTERECTOMY      @ age 40    OOPHORECTOMY      @age 40    OR COLONOSCOPY FLX DX W/COLLJ SPEC WHEN PFRMD N/A 4/2/2019    Procedure: COLONOSCOPY;  Surgeon: Hunter Hendricks MD;  Location: [facility]; Service: Gastroenterology       Family History   Problem Relation Age of Onset    Cancer Mother         ? esophageal    Hypertension Mother     Lung cancer Father     Glaucoma Father     Breast cancer Maternal Aunt 79    Stomach cancer Sister     Liver cancer Sister     No Known Problems Maternal Grandmother     No Known Problems Maternal Grandfather     No Known Problems Paternal Grandmother     No Known Problems Paternal Grandfather     Thyroid cancer Sister     No Known Problems Sister     No Known Problems Sister     No Known Problems Paternal Aunt      I have reviewed and agree with the history as documented  E-Cigarette/Vaping     E-Cigarette/Vaping Substances     Social History     Tobacco Use    Smoking status: Never Smoker    Smokeless tobacco: Never Used   Substance Use Topics    Alcohol use: Yes     Comment: oscional    Drug use: Never       Review of Systems   Gastrointestinal: Positive for abdominal pain, diarrhea and nausea  Negative for blood in stool and constipation  All other systems reviewed and are negative  Physical Exam  Physical Exam  Vitals and nursing note reviewed  Constitutional:       General: She is not in acute distress       Appearance: She is well-developed  HENT:      Head: Normocephalic and atraumatic  Eyes:      Pupils: Pupils are equal, round, and reactive to light  Cardiovascular:      Rate and Rhythm: Normal rate and regular rhythm  Heart sounds: Normal heart sounds  No murmur heard  Pulmonary:      Effort: Pulmonary effort is normal  No respiratory distress  Breath sounds: Normal breath sounds  No wheezing or rales  Abdominal:      General: Bowel sounds are normal  There is no distension  Palpations: Abdomen is soft  Tenderness: There is abdominal tenderness in the right upper quadrant  There is no guarding or rebound  Positive signs include Huerta's sign  Musculoskeletal:         General: No deformity  Normal range of motion  Cervical back: Normal range of motion and neck supple  Lymphadenopathy:      Cervical: No cervical adenopathy  Skin:     Capillary Refill: Capillary refill takes less than 2 seconds  Findings: No erythema or rash  Neurological:      Mental Status: She is alert and oriented to person, place, and time  Cranial Nerves: No cranial nerve deficit  Motor: No abnormal muscle tone        Coordination: Coordination normal    Psychiatric:         Behavior: Behavior normal          Vital Signs  ED Triage Vitals [06/27/22 1157]   Temperature Pulse Respirations Blood Pressure SpO2   98 1 °F (36 7 °C) 89 16 121/75 97 %      Temp Source Heart Rate Source Patient Position - Orthostatic VS BP Location FiO2 (%)   Oral Monitor Sitting Left arm --      Pain Score       3           Vitals:    06/27/22 1157   BP: 121/75   Pulse: 89   Patient Position - Orthostatic VS: Sitting         Visual Acuity      ED Medications  Medications   brimonidine tartrate 0 2 % ophthalmic solution 1 drop (has no administration in time range)   dorzolamide-timolol (COSOPT) 22 3-6 8 MG/ML ophthalmic solution 1 drop (has no administration in time range)   levothyroxine tablet 50 mcg (has no administration in time range)   lactated ringers infusion (has no administration in time range)   ondansetron (ZOFRAN) injection 4 mg (has no administration in time range)   heparin (porcine) subcutaneous injection 5,000 Units (has no administration in time range)   HYDROmorphone (DILAUDID) injection 0 5 mg (has no administration in time range)   HYDROmorphone HCl (DILAUDID) injection 0 2 mg (has no administration in time range)   ceFAZolin (ANCEF) IVPB (premix in dextrose) 2,000 mg 50 mL (has no administration in time range)   metroNIDAZOLE (FLAGYL) IVPB (premix) 500 mg 100 mL (has no administration in time range)   sodium chloride 0 9 % bolus 1,000 mL (0 mL Intravenous Stopped 6/27/22 1318)       Diagnostic Studies  Results Reviewed     Procedure Component Value Units Date/Time    [place] draw [[id number]] Collected: 06/27/22 1216    Lab Status: Final result Specimen: Blood from Arm, Left Updated: 06/27/22 1403    Narrative: The following orders were created for panel order [place] draw  Procedure                               Abnormality         Status                     ---------                               -----------         ------                     Mere Lo Top on [id number][[id number]]                           Final result               Gold top on RFDB[[id number]]                                 Final result               Green / Black tube on BZXY[[id number]]                       Final result                 Please view results for these tests on the individual orders      Comprehensive metabolic panel [[id number]]  (Abnormal) Collected: 06/27/22 1216    Lab Status: Final result Specimen: Blood from Arm, Left Updated: 06/27/22 1323     Sodium 137 mmol/L      Potassium 3 8 mmol/L      Chloride 102 mmol/L      CO2 19 mmol/L      ANION GAP 16 mmol/L      BUN 19 mg/dL      Creatinine 0 75 mg/dL      Glucose 140 mg/dL      Calcium 9 5 mg/dL      AST 18 U/L      ALT 18 U/L      Alkaline Phosphatase 66 U/L      Total Protein 8 0 g/dL      Albumin 4 2 g/dL      Total Bilirubin 1 61 mg/dL      eGFR 81 ml/min/1 73sq m     Narrative:      [place] guidelines for Chronic Kidney Disease (CKD):     Stage 1 with normal or high GFR (GFR > 90 mL/min/1 73 square meters)    Stage 2 Mild CKD (GFR = 60-89 mL/min/1 73 square meters)    Stage 3A Moderate CKD (GFR = 45-59 mL/min/1 73 square meters)    Stage 3B Moderate CKD (GFR = 30-44 mL/min/1 73 square meters)    Stage 4 Severe CKD (GFR = 15-29 mL/min/1 73 square meters)    Stage 5 End Stage CKD (GFR <15 mL/min/1 73 square meters)  Note: GFR calculation is accurate only with a steady state creatinine    Lipase [[id number]]  (Abnormal) Collected: 06/27/22 1216    Lab Status: Final result Specimen: Blood from Arm, Left Updated: 06/27/22 1323     Lipase <6 u/L     CBC and differential [[id number]]  (Abnormal) Collected: 06/27/22 1216    Lab Status: Final result Specimen: Blood from Arm, Left Updated: 06/27/22 1231     WBC 24 29 Thousand/uL      RBC 5 43 Million/uL      Hemoglobin 16 2 g/dL      Hematocrit 48 7 %      MCV 90 fL      MCH 29 8 pg      MCHC 33 3 g/dL      RDW 14 2 %      MPV 11 3 fL      Platelets 780 Thousands/uL      nRBC 0 /100 WBCs      Neutrophils Relative 82 %      Immat GRANS % 1 %      Lymphocytes Relative 7 %      Monocytes Relative 10 %      Eosinophils Relative 0 %      Basophils Relative 0 %      Neutrophils Absolute 19 87 Thousands/µL      Immature Grans Absolute 0 17 Thousand/uL      Lymphocytes Absolute 1 75 Thousands/µL      Monocytes Absolute 2 39 Thousand/µL      Eosinophils Absolute 0 01 Thousand/µL      Basophils Absolute 0 10 Thousands/µL                  US right upper quadrant   Final Result by David Alexandra MD (06/27 1600)      Gallstones and secondary signs of cholecystitis  No evidence of choledocholithiasis        Workstation performed: [id number]         CT abdomen pelvis wo contrast   Final Result by Jed Pelayo MD (06/27 5)      Cholelithiasis with CT findings consistent with acute cholecystitis  No biliary ductal dilatation  The study was marked in [facility] for immediate notification  Workstation performed: [id number]         XR chest portable    (Results Pending)              Procedures  Procedures         ED Course                                             MDM  Number of Diagnoses or Management Options  Abdominal pain: new and requires workup  Cholecystitis: new and requires workup  Diagnosis management comments: Right upper quadrant tenderness, positive Huerta sign  CT scan concerning for acute cholecystitis, patient with leukocytosis  Right upper quadrant ultrasound ordered, general surgery consult, saw patient emergency department admitted to the service  Patient hemodynamically stable  Patient made NPO following diagnosis of cholecystitis         Amount and/or Complexity of Data Reviewed  Clinical lab tests: ordered and reviewed  Tests in the radiology section of CPT®: ordered and reviewed  Tests in the medicine section of CPT®: ordered and reviewed  Discuss the patient with other providers: yes    Risk of Complications, Morbidity, and/or Mortality  Presenting problems: high  Diagnostic procedures: high  Management options: high    Patient Progress  Patient progress: stable      Disposition  Final diagnoses:   Abdominal pain   Cholecystitis     Time reflects when diagnosis was documented in both MDM as applicable and the Disposition within this note     Time User Action Codes Description Comment    6/27/2022  3:01 PM Tk Johns Add [R10 9] Abdominal pain     6/27/2022  3:01 PM Tk Johns Add [K81 9] Cholecystitis     6/27/2022  3:24 PM Mirta Corley Add [K81 0] Acute cholecystitis       ED Disposition     ED Disposition   Admit    Condition   Stable    Date/Time   Mon Jun 27, 2022  3:01 PM    Comment   Case was discussed with Bettie Carmen PA-C and the patient's admission status was agreed to be Admission Status: inpatient status to the service of Dr Migue Rojas   Follow-up Information    None         Patient's Medications   Discharge Prescriptions    No medications on file       No discharge procedures on file      PDMP Review     None          ED Provider  Electronically Signed by           Milagros Clark MD  06/27/22 2129

## 2022-06-27 NOTE — ANESTHESIA POSTPROCEDURE EVALUATION
Post-Op Assessment Note    CV Status:  Stable    Pain management: adequate     Mental Status:  Lethargic and sleepy   Hydration Status:  Stable   PONV Controlled:  None   Airway Patency:  Patent      Post Op Vitals Reviewed: Yes      Staff: CRNA         No complications documented      BP   119/56   Temp   98 7   Pulse  95   Resp   18   SpO2   97

## 2022-06-27 NOTE — OP NOTE
OPERATIVE REPORT  PATIENT NAME: Margarita Hagan    :  1950  MRN: [id number]  Pt Location: [facility] OR ROOM 03    SURGERY DATE: 2022    Surgeon(s) and Role:     Umang Bernabe DO - Primary     * Emiliano Doran MD - Assisting     * Adalberto Patel MD - Assisting    Preop Diagnosis:  Acute cholecystitis [K81 0]    Post-Op Diagnosis Codes:     * Acute cholecystitis [K81 0]    Procedure(s) (LRB):  CHOLECYSTECTOMY LAPAROSCOPIC (N/A)    Specimen(s):  ID Type Source Tests Collected by Time Destination   1 :  Tissue Gallbladder TISSUE EXAM Caesar BernabeDO 2022 1856        Estimated Blood Loss:   100 mL    Drains:  * No LDAs found *    Anesthesia Type:   General    Operative Indications:  Acute cholecystitis [K81 0]    Operative Findings:  Severely inflamed gallbladder with omental adhesions  Severe inflammation of hepatocystic triangle, transection of cystic artery excised while obtaining the critical view  Cystic artery was triply clipped proximally  Cystic duct identified, triply clipped and excised  Complications:   None    Procedure and Technique:  Patient was identified in the preoperative holding area via wrist band, and taken to the operating room in stable condition  Again identified verbally and via wrist band, laid supine on the operating table, both arms were extended, pressure points were padded, abdomen was prepped and draped in the regular sterile fashion, time-out was called all are in agreement  Attention was drawn just superior to the umbilicus, generous amount of local anesthesia was injected subcutaneously and down to the fashion we will like fashion  10 mm transverse incision was made, subcutaneous tissues were bluntly dissected with Kocher forceps, fascia was grasped and retracted anteriorly  Stab incision was made in the fascia with 11 blade scalpel, 0 Vicryl stay sutures were placed and 12 mm trocar was introduced into the abdomen    Patient was placed in steep reverse Trendelenburg with right side up, a 12 mm subxiphoid, in 2 assistant 5 mm ports were placed in the right upper quadrant under direct vision  Upon entry into the abdomen, we identified that there was severe inflammation of the gallbladder, and multiple omental adhesions adherent to the anterior gallbladder wall  Needle decompression was performed, and approximately 20 cc of black bile was removed from the gallbladder  Assistant grasped the gallbladder retracting it cephalad and medially  Adhesions were bluntly dissected off of the anterior gallbladder wall  Once we had identified the infundibulum of the gallbladder, gallbladder was scored both medially and laterally in a V like fashion  Dense inflammatory rind was dissected off of the medial and lateral surface of the gallbladder ensuring to stay superior to Rouviere's sulcus  Upon our medial dissection of this inflammatory rind, tubular structure entering the gallbladder was excised, this was found to be the cystic artery  It was clamped proximally with Vikki forceps, and triply clipped, we ensured excellent hemostasis  Next we proceeded to fully dissect out the hepatocystic triangle, identified our cystic duct entering the gallbladder, this was circumferentially skeletonized, and triply clipped  Transected proximally with endo-scissors  Gallbladder was then retracted cephalad, and dissected off of the liver bed with Bovie electrocautery  Gallbladder was then placed in endobag, removed through the umbilical port, handed off for standard formalin pathology  Additional gallstones that had spilled into the field were placed into an additional endobag which was removed through the subxiphoid port  Patient was then taken out of reverse Trendelenburg, gallbladder fossa, Almonte's pouch, and right pericolic gutter were thoroughly irrigated with 2 L of warm normal saline solution    Next we inspected the gallbladder fossa, cystic duct and cystic artery clips remained in place  There was no bleeding, there was no bile spillage  Remaining ports were removed from abdomen under direct vision  Abdomen was desufflated, umbilical port site fascia was closed with stay sutures  At the end of the case sponge and instrument counts were performed all were correct  All skin was closed with 4-0 Monocryl, Exofin skin glue was applied  Patient was awoken from anesthesia, taken to PACU in stable condition  Dr Bernabe was present for the entire procedure        I was present for the entire procedure    Patient Disposition:  PACU       SIGNATURE: Иван Cole MD  DATE: June 27, 2022  TIME: 7:38 PM

## 2022-06-27 NOTE — ANESTHESIA PREPROCEDURE EVALUATION
Procedure:  CHOLECYSTECTOMY LAPAROSCOPIC (N/A Abdomen)    Relevant Problems   Digestive   (+) Acute cholecystitis        Physical Exam    Airway    Mallampati score: II  TM Distance: >3 FB  Neck ROM: full     Dental       Cardiovascular  Cardiovascular exam normal    Pulmonary  Pulmonary exam normal     Other Findings        Anesthesia Plan  ASA Score- 2     Anesthesia Type- general with ASA Monitors  Additional Monitors:   Airway Plan: ETT  Plan Factors-Exercise tolerance (METS): >4 METS  Chart reviewed  EKG reviewed  Imaging results reviewed  Existing labs reviewed  Patient summary reviewed  Patient is not a current smoker  Patient did not smoke on day of surgery  Obstructive sleep apnea risk education given perioperatively  Induction- intravenous  Postoperative Plan- Plan for postoperative opioid use  Planned trial extubation    Informed Consent- Anesthetic plan and risks discussed with patient  I personally reviewed this patient with the CRNA  Discussed and agreed on the Anesthesia Plan with the CRNA  Filippo Cain

## 2022-06-28 ENCOUNTER — APPOINTMENT (INPATIENT)
Dept: MRI IMAGING | Facility: HOSPITAL | Age: 72
DRG: 418 | End: 2022-06-28
Payer: COMMERCIAL

## 2022-06-28 ENCOUNTER — APPOINTMENT (INPATIENT)
Dept: GASTROENTEROLOGY | Facility: HOSPITAL | Age: 72
DRG: 418 | End: 2022-06-28
Payer: COMMERCIAL

## 2022-06-28 ENCOUNTER — APPOINTMENT (INPATIENT)
Dept: RADIOLOGY | Facility: HOSPITAL | Age: 72
DRG: 418 | End: 2022-06-28
Payer: COMMERCIAL

## 2022-06-28 ENCOUNTER — ANESTHESIA EVENT (INPATIENT)
Dept: GASTROENTEROLOGY | Facility: HOSPITAL | Age: 72
DRG: 418 | End: 2022-06-28
Payer: COMMERCIAL

## 2022-06-28 ENCOUNTER — ANESTHESIA (INPATIENT)
Dept: GASTROENTEROLOGY | Facility: HOSPITAL | Age: 72
DRG: 418 | End: 2022-06-28
Payer: COMMERCIAL

## 2022-06-28 LAB
ALBUMIN SERPL BCP-MCNC: 3.4 G/DL (ref 3.5–5)
ALP SERPL-CCNC: 193 U/L (ref 34–104)
ALT SERPL W P-5'-P-CCNC: 375 U/L (ref 7–52)
ANION GAP SERPL CALCULATED.3IONS-SCNC: 15 MMOL/L (ref 4–13)
AST SERPL W P-5'-P-CCNC: 606 U/L (ref 13–39)
BASOPHILS # BLD AUTO: 0.02 THOUSANDS/ΜL (ref 0–0.1)
BASOPHILS NFR BLD AUTO: 0 % (ref 0–1)
BILIRUB SERPL-MCNC: 3.94 MG/DL (ref 0.2–1)
BUN SERPL-MCNC: 19 MG/DL (ref 5–25)
CALCIUM ALBUM COR SERPL-MCNC: 9.1 MG/DL (ref 8.3–10.1)
CALCIUM SERPL-MCNC: 8.6 MG/DL (ref 8.4–10.2)
CHLORIDE SERPL-SCNC: 108 MMOL/L (ref 96–108)
CO2 SERPL-SCNC: 17 MMOL/L (ref 21–32)
CREAT SERPL-MCNC: 0.65 MG/DL (ref 0.6–1.3)
EOSINOPHIL # BLD AUTO: 0 THOUSAND/ΜL (ref 0–0.61)
EOSINOPHIL NFR BLD AUTO: 0 % (ref 0–6)
ERYTHROCYTE [DISTWIDTH] IN BLOOD BY AUTOMATED COUNT: 14.3 % (ref 11.6–15.1)
GFR SERPL CREATININE-BSD FRML MDRD: 90 ML/MIN/1.73SQ M
GLUCOSE SERPL-MCNC: 100 MG/DL (ref 65–140)
GLUCOSE SERPL-MCNC: 105 MG/DL (ref 65–140)
GLUCOSE SERPL-MCNC: 109 MG/DL (ref 65–140)
GLUCOSE SERPL-MCNC: 120 MG/DL (ref 65–140)
HCT VFR BLD AUTO: 43.7 % (ref 34.8–46.1)
HGB BLD-MCNC: 13.8 G/DL (ref 11.5–15.4)
IMM GRANULOCYTES # BLD AUTO: 0.14 THOUSAND/UL (ref 0–0.2)
IMM GRANULOCYTES NFR BLD AUTO: 1 % (ref 0–2)
LYMPHOCYTES # BLD AUTO: 1.23 THOUSANDS/ΜL (ref 0.6–4.47)
LYMPHOCYTES NFR BLD AUTO: 7 % (ref 14–44)
MCH RBC QN AUTO: 29.6 PG (ref 26.8–34.3)
MCHC RBC AUTO-ENTMCNC: 31.6 G/DL (ref 31.4–37.4)
MCV RBC AUTO: 94 FL (ref 82–98)
MONOCYTES # BLD AUTO: 1.3 THOUSAND/ΜL (ref 0.17–1.22)
MONOCYTES NFR BLD AUTO: 7 % (ref 4–12)
NEUTROPHILS # BLD AUTO: 14.97 THOUSANDS/ΜL (ref 1.85–7.62)
NEUTS SEG NFR BLD AUTO: 85 % (ref 43–75)
NRBC BLD AUTO-RTO: 0 /100 WBCS
PLATELET # BLD AUTO: 273 THOUSANDS/UL (ref 149–390)
PMV BLD AUTO: 11.5 FL (ref 8.9–12.7)
POTASSIUM SERPL-SCNC: 4.5 MMOL/L (ref 3.5–5.3)
PROT SERPL-MCNC: 6.6 G/DL (ref 6.4–8.4)
RBC # BLD AUTO: 4.67 MILLION/UL (ref 3.81–5.12)
SODIUM SERPL-SCNC: 140 MMOL/L (ref 135–147)
WBC # BLD AUTO: 17.66 THOUSAND/UL (ref 4.31–10.16)

## 2022-06-28 PROCEDURE — 85025 COMPLETE CBC W/AUTO DIFF WBC: CPT | Performed by: SURGERY

## 2022-06-28 PROCEDURE — 0FC98ZZ EXTIRPATION OF MATTER FROM COMMON BILE DUCT, VIA NATURAL OR ARTIFICIAL OPENING ENDOSCOPIC: ICD-10-PCS | Performed by: INTERNAL MEDICINE

## 2022-06-28 PROCEDURE — 76376 3D RENDER W/INTRP POSTPROCES: CPT

## 2022-06-28 PROCEDURE — 82948 REAGENT STRIP/BLOOD GLUCOSE: CPT

## 2022-06-28 PROCEDURE — 74181 MRI ABDOMEN W/O CONTRAST: CPT

## 2022-06-28 PROCEDURE — A9585 GADOBUTROL INJECTION: HCPCS | Performed by: INTERNAL MEDICINE

## 2022-06-28 PROCEDURE — 43262 ENDO CHOLANGIOPANCREATOGRAPH: CPT | Performed by: INTERNAL MEDICINE

## 2022-06-28 PROCEDURE — 80053 COMPREHEN METABOLIC PANEL: CPT | Performed by: SURGERY

## 2022-06-28 PROCEDURE — 74328 X-RAY BILE DUCT ENDOSCOPY: CPT

## 2022-06-28 PROCEDURE — C1769 GUIDE WIRE: HCPCS

## 2022-06-28 PROCEDURE — 99024 POSTOP FOLLOW-UP VISIT: CPT | Performed by: SURGERY

## 2022-06-28 PROCEDURE — 43264 ERCP REMOVE DUCT CALCULI: CPT | Performed by: INTERNAL MEDICINE

## 2022-06-28 PROCEDURE — 99223 1ST HOSP IP/OBS HIGH 75: CPT | Performed by: INTERNAL MEDICINE

## 2022-06-28 PROCEDURE — 0F798ZZ DILATION OF COMMON BILE DUCT, VIA NATURAL OR ARTIFICIAL OPENING ENDOSCOPIC: ICD-10-PCS | Performed by: INTERNAL MEDICINE

## 2022-06-28 PROCEDURE — G1004 CDSM NDSC: HCPCS

## 2022-06-28 RX ORDER — ONDANSETRON 2 MG/ML
4 INJECTION INTRAMUSCULAR; INTRAVENOUS ONCE AS NEEDED
Status: DISCONTINUED | OUTPATIENT
Start: 2022-06-28 | End: 2022-06-30 | Stop reason: HOSPADM

## 2022-06-28 RX ORDER — SUCCINYLCHOLINE/SOD CL,ISO/PF 100 MG/5ML
SYRINGE (ML) INTRAVENOUS AS NEEDED
Status: DISCONTINUED | OUTPATIENT
Start: 2022-06-28 | End: 2022-06-28

## 2022-06-28 RX ORDER — SODIUM CHLORIDE, SODIUM LACTATE, POTASSIUM CHLORIDE, CALCIUM CHLORIDE 600; 310; 30; 20 MG/100ML; MG/100ML; MG/100ML; MG/100ML
INJECTION, SOLUTION INTRAVENOUS CONTINUOUS PRN
Status: DISCONTINUED | OUTPATIENT
Start: 2022-06-28 | End: 2022-06-28

## 2022-06-28 RX ORDER — DEXAMETHASONE SODIUM PHOSPHATE 10 MG/ML
INJECTION, SOLUTION INTRAMUSCULAR; INTRAVENOUS AS NEEDED
Status: DISCONTINUED | OUTPATIENT
Start: 2022-06-28 | End: 2022-06-28

## 2022-06-28 RX ORDER — ONDANSETRON 2 MG/ML
INJECTION INTRAMUSCULAR; INTRAVENOUS AS NEEDED
Status: DISCONTINUED | OUTPATIENT
Start: 2022-06-28 | End: 2022-06-28

## 2022-06-28 RX ORDER — LIDOCAINE HYDROCHLORIDE 10 MG/ML
INJECTION, SOLUTION EPIDURAL; INFILTRATION; INTRACAUDAL; PERINEURAL AS NEEDED
Status: DISCONTINUED | OUTPATIENT
Start: 2022-06-28 | End: 2022-06-28

## 2022-06-28 RX ORDER — METRONIDAZOLE 500 MG/100ML
500 INJECTION, SOLUTION INTRAVENOUS EVERY 8 HOURS
Status: DISCONTINUED | OUTPATIENT
Start: 2022-06-28 | End: 2022-06-29

## 2022-06-28 RX ORDER — METOCLOPRAMIDE HYDROCHLORIDE 5 MG/ML
10 INJECTION INTRAMUSCULAR; INTRAVENOUS ONCE AS NEEDED
Status: DISCONTINUED | OUTPATIENT
Start: 2022-06-28 | End: 2022-06-30 | Stop reason: HOSPADM

## 2022-06-28 RX ORDER — LIDOCAINE HYDROCHLORIDE 10 MG/ML
0.5 INJECTION, SOLUTION EPIDURAL; INFILTRATION; INTRACAUDAL; PERINEURAL ONCE AS NEEDED
Status: DISCONTINUED | OUTPATIENT
Start: 2022-06-28 | End: 2022-06-28

## 2022-06-28 RX ORDER — FENTANYL CITRATE 50 UG/ML
INJECTION, SOLUTION INTRAMUSCULAR; INTRAVENOUS AS NEEDED
Status: DISCONTINUED | OUTPATIENT
Start: 2022-06-28 | End: 2022-06-28

## 2022-06-28 RX ORDER — PROPOFOL 10 MG/ML
INJECTION, EMULSION INTRAVENOUS CONTINUOUS PRN
Status: DISCONTINUED | OUTPATIENT
Start: 2022-06-28 | End: 2022-06-28

## 2022-06-28 RX ORDER — PROPOFOL 10 MG/ML
INJECTION, EMULSION INTRAVENOUS AS NEEDED
Status: DISCONTINUED | OUTPATIENT
Start: 2022-06-28 | End: 2022-06-28

## 2022-06-28 RX ORDER — CEFAZOLIN SODIUM 2 G/50ML
2000 SOLUTION INTRAVENOUS EVERY 8 HOURS
Status: DISCONTINUED | OUTPATIENT
Start: 2022-06-28 | End: 2022-06-29

## 2022-06-28 RX ORDER — DIPHENHYDRAMINE HYDROCHLORIDE 50 MG/ML
12.5 INJECTION INTRAMUSCULAR; INTRAVENOUS ONCE AS NEEDED
Status: DISCONTINUED | OUTPATIENT
Start: 2022-06-28 | End: 2022-06-30 | Stop reason: HOSPADM

## 2022-06-28 RX ADMIN — ONDANSETRON 4 MG: 2 INJECTION INTRAMUSCULAR; INTRAVENOUS at 17:08

## 2022-06-28 RX ADMIN — GADOBUTROL 9 ML: 604.72 INJECTION INTRAVENOUS at 17:22

## 2022-06-28 RX ADMIN — ACETAMINOPHEN 975 MG: 325 TABLET, FILM COATED ORAL at 13:16

## 2022-06-28 RX ADMIN — OXYCODONE HYDROCHLORIDE 5 MG: 5 TABLET ORAL at 22:01

## 2022-06-28 RX ADMIN — BRIMONIDINE TARTRATE 1 DROP: 2 SOLUTION/ DROPS OPHTHALMIC at 21:13

## 2022-06-28 RX ADMIN — CEFAZOLIN SODIUM 2000 MG: 2 SOLUTION INTRAVENOUS at 20:31

## 2022-06-28 RX ADMIN — METRONIDAZOLE 500 MG: 500 INJECTION, SOLUTION INTRAVENOUS at 13:56

## 2022-06-28 RX ADMIN — ACETAMINOPHEN 975 MG: 325 TABLET, FILM COATED ORAL at 22:01

## 2022-06-28 RX ADMIN — LIDOCAINE HYDROCHLORIDE 50 MG: 10 INJECTION, SOLUTION EPIDURAL; INFILTRATION; INTRACAUDAL at 16:58

## 2022-06-28 RX ADMIN — ACETAMINOPHEN 975 MG: 325 TABLET, FILM COATED ORAL at 06:34

## 2022-06-28 RX ADMIN — FENTANYL CITRATE 75 MCG: 50 INJECTION, SOLUTION INTRAMUSCULAR; INTRAVENOUS at 16:58

## 2022-06-28 RX ADMIN — SODIUM CHLORIDE, SODIUM LACTATE, POTASSIUM CHLORIDE, AND CALCIUM CHLORIDE 125 ML/HR: .6; .31; .03; .02 INJECTION, SOLUTION INTRAVENOUS at 18:25

## 2022-06-28 RX ADMIN — SODIUM CHLORIDE, SODIUM LACTATE, POTASSIUM CHLORIDE, AND CALCIUM CHLORIDE 125 ML/HR: .6; .31; .03; .02 INJECTION, SOLUTION INTRAVENOUS at 23:42

## 2022-06-28 RX ADMIN — DORZOLAMIDE HYDROCHLORIDE AND TIMOLOL MALEATE 1 DROP: 20; 5 SOLUTION/ DROPS OPHTHALMIC at 18:22

## 2022-06-28 RX ADMIN — PROPOFOL 150 MG: 10 INJECTION, EMULSION INTRAVENOUS at 16:58

## 2022-06-28 RX ADMIN — HEPARIN SODIUM 5000 UNITS: 5000 INJECTION INTRAVENOUS; SUBCUTANEOUS at 22:01

## 2022-06-28 RX ADMIN — BRIMONIDINE TARTRATE 1 DROP: 2 SOLUTION/ DROPS OPHTHALMIC at 06:37

## 2022-06-28 RX ADMIN — FENTANYL CITRATE 50 MCG: 50 INJECTION, SOLUTION INTRAMUSCULAR; INTRAVENOUS at 17:15

## 2022-06-28 RX ADMIN — DORZOLAMIDE HYDROCHLORIDE AND TIMOLOL MALEATE 1 DROP: 20; 5 SOLUTION/ DROPS OPHTHALMIC at 08:33

## 2022-06-28 RX ADMIN — OXYCODONE HYDROCHLORIDE 10 MG: 10 TABLET ORAL at 06:34

## 2022-06-28 RX ADMIN — METRONIDAZOLE 500 MG: 500 INJECTION, SOLUTION INTRAVENOUS at 00:47

## 2022-06-28 RX ADMIN — SODIUM CHLORIDE, SODIUM LACTATE, POTASSIUM CHLORIDE, AND CALCIUM CHLORIDE: .6; .31; .03; .02 INJECTION, SOLUTION INTRAVENOUS at 17:01

## 2022-06-28 RX ADMIN — PHENYLEPHRINE HYDROCHLORIDE 50 MCG/MIN: 10 INJECTION INTRAVENOUS at 16:58

## 2022-06-28 RX ADMIN — DEXAMETHASONE SODIUM PHOSPHATE 10 MG: 10 INJECTION, SOLUTION INTRAMUSCULAR; INTRAVENOUS at 17:01

## 2022-06-28 RX ADMIN — LEVOTHYROXINE SODIUM 50 MCG: 50 TABLET ORAL at 06:34

## 2022-06-28 RX ADMIN — FENTANYL CITRATE 50 MCG: 50 INJECTION, SOLUTION INTRAMUSCULAR; INTRAVENOUS at 17:23

## 2022-06-28 RX ADMIN — HEPARIN SODIUM 5000 UNITS: 5000 INJECTION INTRAVENOUS; SUBCUTANEOUS at 06:34

## 2022-06-28 RX ADMIN — Medication 100 MG: at 16:58

## 2022-06-28 RX ADMIN — FENTANYL CITRATE 25 MCG: 50 INJECTION, SOLUTION INTRAMUSCULAR; INTRAVENOUS at 17:09

## 2022-06-28 RX ADMIN — METRONIDAZOLE 500 MG: 500 INJECTION, SOLUTION INTRAVENOUS at 21:09

## 2022-06-28 RX ADMIN — PROPOFOL 80 MCG/KG/MIN: 10 INJECTION, EMULSION INTRAVENOUS at 16:58

## 2022-06-28 RX ADMIN — INDOMETHACIN 100 MG: 50 SUPPOSITORY RECTAL at 17:09

## 2022-06-28 RX ADMIN — CEFAZOLIN SODIUM 2000 MG: 2 SOLUTION INTRAVENOUS at 13:16

## 2022-06-28 NOTE — ANESTHESIA PREPROCEDURE EVALUATION
Procedure:  ERCP    Relevant Problems   ANESTHESIA (within normal limits)      CARDIO   (+) Mixed hyperlipidemia      ENDO (within normal limits)      GI/HEPATIC (within normal limits)      /RENAL (within normal limits)      GYN (within normal limits)      HEMATOLOGY   (+) Lymphoma (HCC)      MUSCULOSKELETAL (within normal limits)      NEURO/PSYCH (within normal limits)      PULMONARY (within normal limits)      Digestive   (+) Acute cholecystitis        Physical Exam    Airway    Mallampati score: II  TM Distance: >3 FB  Neck ROM: full     Dental   No notable dental hx     Cardiovascular  Rhythm: regular, Rate: normal, Cardiovascular exam normal    Pulmonary  Pulmonary exam normal Breath sounds clear to auscultation,     Other Findings        Anesthesia Plan  ASA Score- 2     Anesthesia Type- general with ASA Monitors  Additional Monitors:   Airway Plan: ETT  Plan Factors-    Chart reviewed  Existing labs reviewed  Patient summary reviewed  Induction- intravenous  Postoperative Plan-     Informed Consent- Anesthetic plan and risks discussed with patient  I personally reviewed this patient with the CRNA  Discussed and agreed on the Anesthesia Plan with the CRNA  Lalitha Aviles Recent labs personally reviewed:  Lab Results   Component Value Date    WBC 17 66 (H) 06/28/2022    HGB 13 8 06/28/2022     06/28/2022     Lab Results   Component Value Date    K 4 5 06/28/2022    BUN 19 06/28/2022    CREATININE 0 65 06/28/2022     I, Eladia Cabral MD, have personally seen and evaluated the patient prior to anesthetic care  I have reviewed the pre-anesthetic record, and other medical records if appropriate to the anesthetic care  If a CRNA is involved in the case, I have reviewed the CRNA assessment, if present, and agree  Risks/benefits and alternatives discussed with patient including possible PONV, sore throat, and possibility of rare anesthetic and surgical emergencies

## 2022-06-28 NOTE — ANESTHESIA POSTPROCEDURE EVALUATION
Post-Op Assessment Note    CV Status:  Stable    Pain management: adequate     Mental Status:  Lethargic and sleepy   Hydration Status:  Stable   PONV Controlled:  None   Airway Patency:  Patent      Post Op Vitals Reviewed: Yes      Staff: CRNA         No complications documented      BP   133/61   Temp  96 5   Pulse  77   Resp   15   SpO2   98

## 2022-06-28 NOTE — CONSULTS
Consultation - [facility]) [facility]  Sheri Warren 80 y o  female MRN: [id number]  Unit/Bed#: S -01 Encounter: [id number]        Inpatient consult to gastroenterology  Consult performed by: Jose Alfredo Pelayo PA-C  Consult ordered by: Kalie Siddiqui MD          Reason for Consult / Principal Problem:  Choledocholithiasis    ASSESSMENT and PLAN:      1  Choledocholithiasis  Patient presented to the emergency room yesterday found to have acute cholecystitis  She underwent laparoscopic cholecystectomy yesterday evening on 06/27  Total bilirubin increased to 3 94 with AST of 606, AST of 375 and alkaline phosphatase of 193  MRI obtained showing evidence of choledocholithiasis, 1 7 cm in length  White count was 24 on arrival, currently 17  Afebrile and alert  She is receiving Ancef and Flagyl  She is doing well with some discomfort around surgical site however otherwise normal   Abdominal exam is benign     -discuss with ERCP attending  Will plan for ERCP today around 430 pm   -keep NPO  -hold afternoon dose of heparin     -discussed with surgery team   -antibiotics per surgery team  -rest of care per surgery    -------------------------------------------------------------------------------------------------------------------    HPI:     Sheri Warren is a 15-year-old female presented to the emergency room yesterday with abdominal pain  Elevated white count on arrival at 24  She had CT scan showing acute cholecystitis  This was confirmed again on ultrasound  LFTs were normal besides mild elevation of total bilirubin at 1 60  She underwent laparoscopic cholecystectomy last night with severely inflamed gallbladder with adhesions  Repeat lab work this morning showed worsening bilirubin of 3 94 with elevated LFTs with an AST of 606, AST of 375 and alkaline phosphatase of 193   MRI with MRCP was then obtained showing filling defect of the distal common bile duct appears linear measuring 1 7 x 0 3 cm with mild biliary ductal dilation as well  Patient reports she is doing well  She feels much better after surgery  She has some mild discomfort at the area of the assistant sites however otherwise is doing well  She has no nausea or vomiting  She did notice her urine becoming darker  She has not had a bowel movement  Reports family history of esophageal cancer in her mother as well as stomach versus liver cancer in her sister  Abdominal surgeries consistent for hysterectomy  No prior endoscopy  Reports colonoscopy last year  REVIEW OF SYSTEMS:    CONSTITUTIONAL: Denies any fever, chills, or rigors  +decreased appetite  HEENT: No earache or tinnitus  Denies hearing loss or visual disturbances  CARDIOVASCULAR: No chest pain or palpitations  RESPIRATORY: Denies any cough, hemoptysis, shortness of breath or dyspnea on exertion  GASTROINTESTINAL: As noted in the History of Present Illness  GENITOURINARY: +dark urine  NEUROLOGIC: No dizziness or vertigo, denies headaches  MUSCULOSKELETAL: Denies any muscle or joint pain  SKIN: Denies skin rashes or itching  ENDOCRINE: Denies excessive thirst  Denies intolerance to heat or cold  PSYCHOSOCIAL: Denies depression or anxiety  Denies any recent memory loss         Historical Information   Past Medical History:   Diagnosis Date    Abnormal results of thyroid function studies     Age-related osteoporosis without current pathological fracture     Anemia     Diffuse large B-cell lymphoma (HCC)     diffuse large B-cell lymphoma unspecified sit    Edema     History of chemotherapy 10/01/2013    Hyperthyroidism     Impaired fasting glucose     Lymphoma ([facility] [address] )     pt stated 5 years remission    Mixed hyperlipidemia     Non Hodgkin's lymphoma ([facility] [address] ) 07/01/2013    unspecified, unspecified sit    Nuclear age-related cataract, both eyes     Obesity     Ocular hypertension, bilateral     Prediabetes     Puckering of macula, left eye     Tremor     unspecified     Past Surgical History:   Procedure Laterality Date    APPENDECTOMY      HIP FRACTURE SURGERY      HYSTERECTOMY      @ age 40    OOPHORECTOMY      @age 40    DE COLONOSCOPY FLX DX W/COLLJ SPEC WHEN PFRMD N/A 4/2/2019    Procedure: COLONOSCOPY;  Surgeon: Alan Banks MD;  Location: [facility];   Service: Gastroenterology     Social History   Social History     Substance and Sexual Activity   Alcohol Use Yes    Comment: oscional     Social History     Substance and Sexual Activity   Drug Use Never     Social History     Tobacco Use   Smoking Status Never Smoker   Smokeless Tobacco Never Used     Family History   Problem Relation Age of Onset    Cancer Mother         ? esophageal    Hypertension Mother     Lung cancer Father     Glaucoma Father     Breast cancer Maternal Aunt 79    Stomach cancer Sister     Liver cancer Sister     No Known Problems Maternal Grandmother     No Known Problems Maternal Grandfather     No Known Problems Paternal Grandmother     No Known Problems Paternal Grandfather     Thyroid cancer Sister     No Known Problems Sister     No Known Problems Sister     No Known Problems Paternal Aunt        Meds/Allergies     Medications Prior to Admission   Medication    acetaminophen (TYLENOL) 500 mg tablet    B Complex Vitamins (VITAMIN B COMPLEX PO)    brimonidine tartrate 0 2 % ophthalmic solution    Cholecalciferol (VITAMIN D3) 5000 units CAPS    Denosumab (PROLIA SC)    Dorzolamide HCl-Timolol Mal PF 22 3-6 8 MG/ML SOLN    GLUCOSAMINE-CHONDROITIN-MSM PO    ibuprofen (MOTRIN) 400 mg tablet    Levothyroxine Sodium (SYNTHROID PO)    multivitamin (THERAGRAN) TABS     Current Facility-Administered Medications   Medication Dose Route Frequency    acetaminophen (TYLENOL) tablet 975 mg  975 mg Oral Q8H [address]    brimonidine tartrate 0 2 % ophthalmic solution 1 drop  1 drop Both Eyes Q8H OMA    ceFAZolin (ANCEF) IVPB (premix in dextrose) 2,000 mg 50 mL  2,000 mg Intravenous Q8H    dorzolamide-timolol (COSOPT) 22 3-6 8 MG/ML ophthalmic solution 1 drop  1 drop Both Eyes BID    heparin (porcine) subcutaneous injection 5,000 Units  5,000 Units Subcutaneous Q8H [address]    HYDROmorphone (DILAUDID) injection 0 5 mg  0 5 mg Intravenous Q4H PRN    insulin lispro (HumaLOG) 100 units/mL subcutaneous injection 1-5 Units  1-5 Units Subcutaneous TID AC    lactated ringers infusion  125 mL/hr Intravenous Continuous    levothyroxine tablet 50 mcg  50 mcg Oral Early Morning    metroNIDAZOLE (FLAGYL) IVPB (premix) 500 mg 100 mL  500 mg Intravenous Q8H    ondansetron (ZOFRAN) injection 4 mg  4 mg Intravenous Q6H PRN    oxyCODONE (ROXICODONE) immediate release tablet 10 mg  10 mg Oral Q4H PRN    oxyCODONE (ROXICODONE) IR tablet 5 mg  5 mg Oral Q4H PRN       Allergies   Allergen Reactions    Adhesive [Medical Tape]      Pt stated      Darvon [Propoxyphene]      Pt stated      Listerine Antiseptic [Antiseptic Mouth Rinse]     Listerine Healthy White Gentle [Sodium Fluoride]      Pt stated listerine, listermint, with fluoride               Objective     Blood pressure 127/68, pulse 85, temperature 97 8 °F (36 6 °C), resp  rate 16, height 4' 10" (1 473 m), weight 72 6 kg (160 lb), SpO2 95 %, not currently breastfeeding  Intake/Output Summary (Last 24 hours) at 6/28/2022 1237  Last data filed at 6/28/2022 0343  Gross per 24 hour   Intake 2050 ml   Output 600 ml   Net 1450 ml         PHYSICAL EXAM:      General Appearance:   Patient is sitting comfortably in bed  Does not appear in distress  HEENT:   Normocephalic, atraumatic, anicteric  Neck:  Supple, symmetrical, trachea midline, no adenopathy  Lungs:   Clear to auscultation bilaterally; no rales, rhonchi or wheezing; respirations unlabored    Heart[de-identified]   S1 and S2 normal; regular rate and rhythm; no murmur, rub, or gallop  Abdomen:   + surgical scars noted  Appear clean dry and intact    Abdomen is soft, bowel sounds present, no tenderness throughout  Genitalia:   Deferred    Rectal:   Deferred    Extremities:  No cyanosis, clubbing or edema    Pulses:  2+ and symmetric all extremities    Skin:  Skin color, texture, turgor normal, no rashes or lesions    Lymph nodes:  No palpable cervical, axillary or inguinal lymphadenopathy        Lab Results:   Results from last 7 days   Lab Units 06/28/22  0543   WBC Thousand/uL 17 66*   HEMOGLOBIN g/dL 13 8   HEMATOCRIT % 43 7   PLATELETS Thousands/uL 273   NEUTROS PCT % 85*   LYMPHS PCT % 7*   MONOS PCT % 7   EOS PCT % 0     Results from last 7 days   Lab Units 06/28/22  0543   POTASSIUM mmol/L 4 5   CHLORIDE mmol/L 108   CO2 mmol/L 17*   BUN mg/dL 19   CREATININE mg/dL 0 65   CALCIUM mg/dL 8 6   ALK PHOS U/L 193*   ALT U/L 375*   AST U/L 606*         Results from last 7 days   Lab Units 06/27/22  1216   LIPASE u/L <6*       Imaging Studies: I have personally reviewed pertinent imaging studies  CT abdomen pelvis wo contrast    Result Date: 6/27/2022  Impression: Cholelithiasis with CT findings consistent with acute cholecystitis  No biliary ductal dilatation  The study was marked in [facility] for immediate notification  Workstation performed: [id number]     XR chest portable    Result Date: 6/27/2022  Impression: No acute cardiopulmonary disease  Workstation performed: [id number]     MRI abdomen wo contrast and mrcp    Result Date: 6/28/2022  Impression: Evidence of choledocholithiasis  New, mild bile duct dilation compared to recent prior studies  Expected postoperative changes and nonemergent findings above  Workstation performed: [id number]     US right upper quadrant    Result Date: 6/27/2022  Impression: Gallstones and secondary signs of cholecystitis  No evidence of choledocholithiasis  Workstation performed: [id number]           Patient was seen and examined by Dr Florinda London  All delcid medical decisions were made by Dr Florinda London   Thank you for allowing us to participate in the care of this present patient  We will follow-up with you closely

## 2022-06-28 NOTE — QUICK NOTE
Post Op Check Note - Surgery Resident  Idalmis Landis 80 y o  female MRN: [id number]  Unit/Bed#: S -01 Encounter: [id number]    ASSESSMENT:  Idalmis Landis is a 80 y o  female who is status post lap remberto    Subjective: Denies any abdominal pain    Physical Exam:  GEN: NAD  CV: RRR  Lung: Normal effort on RA  Ab: Soft, NT/ND, benign postop exam  Neuro: A+Ox3  Incisions: CDI    Blood pressure 131/79, pulse 91, temperature 97 8 °F (36 6 °C), resp  rate 17, height 4' 10" (1 473 m), weight 72 6 kg (160 lb), SpO2 91 %, not currently breastfeeding  ,Body mass index is 33 44 kg/m²        Intake/Output Summary (Last 24 hours) at 6/27/2022 2301  Last data filed at 6/27/2022 1918  Gross per 24 hour   Intake 2050 ml   Output 100 ml   Net 1950 ml       Invasive Devices  Report    Peripheral Intravenous Line  Duration           Peripheral IV 06/27/22 Left Antecubital <1 day                VTE Pharmacologic Prophylaxis: Heparin

## 2022-06-28 NOTE — PROGRESS NOTES
Progress Note - General Surgery  [facility]    Carlos Manuel Herndon 80 y o  female MRN: [id number]  Unit/Bed#: S -01 Encounter: [id number]      Assessment:  80 y o  female POD 1 s/p lap remberto for acute cholecystitis       Plan:  Lo fat diet  Off IVF after tolerates breakfast  Stop abx today  PRN analgesia  Insulin for pre-DM  SQH    Subjective: No acute complaints, endorses some minimal soreness      Objective:     Physical Exam:  GEN: NAD   Ab: Soft, appropriately mild tenderness RUQ/ND, CDI  Lung: Normal effort on RA  CV: RRR   Extrem: No CCE   Neuro: A+Ox3       I/O       06/26 0701  06/27 0700 06/27 0701 06/28 0700    I V  (mL/kg)  1500 (20 7)    IV Piggyback  550    Total Intake(mL/kg)  2050 (28 2)    Blood  100    Total Output  100    Net  +1950                Lab, Imaging and other studies: I have personally reviewed pertinent reports    , CBC with diff:   Lab Results   Component Value Date    WBC 24 29 (H) 06/27/2022    HGB 16 2 (H) 06/27/2022    HCT 48 7 (H) 06/27/2022    MCV 90 06/27/2022     06/27/2022    MCH 29 8 06/27/2022    MCHC 33 3 06/27/2022    RDW 14 2 06/27/2022    MPV 11 3 06/27/2022    NRBC 0 06/27/2022   , BMP/CMP:   Lab Results   Component Value Date    SODIUM 137 06/27/2022    K 3 8 06/27/2022     06/27/2022    CO2 19 (L) 06/27/2022    BUN 19 06/27/2022    CREATININE 0 75 06/27/2022    CALCIUM 9 5 06/27/2022    AST 18 06/27/2022    ALT 18 06/27/2022    ALKPHOS 66 06/27/2022    EGFR 81 06/27/2022         VTE Pharmacologic Prophylaxis: Heparin        Mirlande Santos MD  6/27/2022 9:34 PM

## 2022-06-29 LAB
ALBUMIN SERPL BCP-MCNC: 3.1 G/DL (ref 3.5–5)
ALP SERPL-CCNC: 193 U/L (ref 34–104)
ALT SERPL W P-5'-P-CCNC: 233 U/L (ref 7–52)
ANION GAP SERPL CALCULATED.3IONS-SCNC: 11 MMOL/L (ref 4–13)
AST SERPL W P-5'-P-CCNC: 175 U/L (ref 13–39)
BASOPHILS # BLD AUTO: 0.02 THOUSANDS/ΜL (ref 0–0.1)
BASOPHILS NFR BLD AUTO: 0 % (ref 0–1)
BILIRUB SERPL-MCNC: 0.81 MG/DL (ref 0.2–1)
BUN SERPL-MCNC: 21 MG/DL (ref 5–25)
CALCIUM ALBUM COR SERPL-MCNC: 8.4 MG/DL (ref 8.3–10.1)
CALCIUM SERPL-MCNC: 7.7 MG/DL (ref 8.4–10.2)
CHLORIDE SERPL-SCNC: 108 MMOL/L (ref 96–108)
CO2 SERPL-SCNC: 18 MMOL/L (ref 21–32)
CREAT SERPL-MCNC: 0.52 MG/DL (ref 0.6–1.3)
EOSINOPHIL # BLD AUTO: 0 THOUSAND/ΜL (ref 0–0.61)
EOSINOPHIL NFR BLD AUTO: 0 % (ref 0–6)
ERYTHROCYTE [DISTWIDTH] IN BLOOD BY AUTOMATED COUNT: 14.4 % (ref 11.6–15.1)
GFR SERPL CREATININE-BSD FRML MDRD: 97 ML/MIN/1.73SQ M
GLUCOSE SERPL-MCNC: 152 MG/DL (ref 65–140)
GLUCOSE SERPL-MCNC: 157 MG/DL (ref 65–140)
GLUCOSE SERPL-MCNC: 176 MG/DL (ref 65–140)
GLUCOSE SERPL-MCNC: 194 MG/DL (ref 65–140)
HCT VFR BLD AUTO: 37.1 % (ref 34.8–46.1)
HGB BLD-MCNC: 12.2 G/DL (ref 11.5–15.4)
IMM GRANULOCYTES # BLD AUTO: 0.08 THOUSAND/UL (ref 0–0.2)
IMM GRANULOCYTES NFR BLD AUTO: 1 % (ref 0–2)
LYMPHOCYTES # BLD AUTO: 0.88 THOUSANDS/ΜL (ref 0.6–4.47)
LYMPHOCYTES NFR BLD AUTO: 7 % (ref 14–44)
MCH RBC QN AUTO: 29.9 PG (ref 26.8–34.3)
MCHC RBC AUTO-ENTMCNC: 32.9 G/DL (ref 31.4–37.4)
MCV RBC AUTO: 91 FL (ref 82–98)
MONOCYTES # BLD AUTO: 0.57 THOUSAND/ΜL (ref 0.17–1.22)
MONOCYTES NFR BLD AUTO: 5 % (ref 4–12)
NEUTROPHILS # BLD AUTO: 10.48 THOUSANDS/ΜL (ref 1.85–7.62)
NEUTS SEG NFR BLD AUTO: 87 % (ref 43–75)
NRBC BLD AUTO-RTO: 0 /100 WBCS
PLATELET # BLD AUTO: 255 THOUSANDS/UL (ref 149–390)
PMV BLD AUTO: 11.3 FL (ref 8.9–12.7)
POTASSIUM SERPL-SCNC: 3.8 MMOL/L (ref 3.5–5.3)
PROT SERPL-MCNC: 6.3 G/DL (ref 6.4–8.4)
RBC # BLD AUTO: 4.08 MILLION/UL (ref 3.81–5.12)
SODIUM SERPL-SCNC: 137 MMOL/L (ref 135–147)
WBC # BLD AUTO: 12.03 THOUSAND/UL (ref 4.31–10.16)

## 2022-06-29 PROCEDURE — 85025 COMPLETE CBC W/AUTO DIFF WBC: CPT | Performed by: INTERNAL MEDICINE

## 2022-06-29 PROCEDURE — 82948 REAGENT STRIP/BLOOD GLUCOSE: CPT

## 2022-06-29 PROCEDURE — 80053 COMPREHEN METABOLIC PANEL: CPT | Performed by: INTERNAL MEDICINE

## 2022-06-29 PROCEDURE — 86677 HELICOBACTER PYLORI ANTIBODY: CPT | Performed by: INTERNAL MEDICINE

## 2022-06-29 PROCEDURE — 99024 POSTOP FOLLOW-UP VISIT: CPT | Performed by: STUDENT IN AN ORGANIZED HEALTH CARE EDUCATION/TRAINING PROGRAM

## 2022-06-29 PROCEDURE — 99232 SBSQ HOSP IP/OBS MODERATE 35: CPT | Performed by: INTERNAL MEDICINE

## 2022-06-29 RX ORDER — LIDOCAINE 50 MG/G
2 PATCH TOPICAL DAILY
Status: DISCONTINUED | OUTPATIENT
Start: 2022-06-29 | End: 2022-06-30 | Stop reason: HOSPADM

## 2022-06-29 RX ORDER — METHOCARBAMOL 500 MG/1
500 TABLET, FILM COATED ORAL EVERY 6 HOURS SCHEDULED
Status: DISCONTINUED | OUTPATIENT
Start: 2022-06-29 | End: 2022-06-30 | Stop reason: HOSPADM

## 2022-06-29 RX ORDER — PANTOPRAZOLE SODIUM 40 MG/1
40 TABLET, DELAYED RELEASE ORAL
Status: DISCONTINUED | OUTPATIENT
Start: 2022-06-29 | End: 2022-06-30 | Stop reason: HOSPADM

## 2022-06-29 RX ORDER — GABAPENTIN 100 MG/1
100 CAPSULE ORAL 3 TIMES DAILY
Status: DISCONTINUED | OUTPATIENT
Start: 2022-06-29 | End: 2022-06-30 | Stop reason: HOSPADM

## 2022-06-29 RX ADMIN — OXYCODONE HYDROCHLORIDE 5 MG: 5 TABLET ORAL at 05:06

## 2022-06-29 RX ADMIN — BRIMONIDINE TARTRATE 1 DROP: 2 SOLUTION/ DROPS OPHTHALMIC at 22:10

## 2022-06-29 RX ADMIN — INSULIN LISPRO 1 UNITS: 100 INJECTION, SOLUTION INTRAVENOUS; SUBCUTANEOUS at 17:17

## 2022-06-29 RX ADMIN — INSULIN LISPRO 1 UNITS: 100 INJECTION, SOLUTION INTRAVENOUS; SUBCUTANEOUS at 14:13

## 2022-06-29 RX ADMIN — METHOCARBAMOL TABLETS 500 MG: 500 TABLET, COATED ORAL at 11:51

## 2022-06-29 RX ADMIN — HEPARIN SODIUM 5000 UNITS: 5000 INJECTION INTRAVENOUS; SUBCUTANEOUS at 22:10

## 2022-06-29 RX ADMIN — INSULIN LISPRO 1 UNITS: 100 INJECTION, SOLUTION INTRAVENOUS; SUBCUTANEOUS at 08:33

## 2022-06-29 RX ADMIN — LEVOTHYROXINE SODIUM 50 MCG: 50 TABLET ORAL at 05:06

## 2022-06-29 RX ADMIN — HEPARIN SODIUM 5000 UNITS: 5000 INJECTION INTRAVENOUS; SUBCUTANEOUS at 05:06

## 2022-06-29 RX ADMIN — METRONIDAZOLE 500 MG: 500 INJECTION, SOLUTION INTRAVENOUS at 05:06

## 2022-06-29 RX ADMIN — DORZOLAMIDE HYDROCHLORIDE AND TIMOLOL MALEATE 1 DROP: 20; 5 SOLUTION/ DROPS OPHTHALMIC at 17:16

## 2022-06-29 RX ADMIN — HEPARIN SODIUM 5000 UNITS: 5000 INJECTION INTRAVENOUS; SUBCUTANEOUS at 13:25

## 2022-06-29 RX ADMIN — GABAPENTIN 100 MG: 100 CAPSULE ORAL at 11:51

## 2022-06-29 RX ADMIN — LIDOCAINE 5% 2 PATCH: 700 PATCH TOPICAL at 11:51

## 2022-06-29 RX ADMIN — PANTOPRAZOLE SODIUM 40 MG: 40 TABLET, DELAYED RELEASE ORAL at 17:13

## 2022-06-29 RX ADMIN — BRIMONIDINE TARTRATE 1 DROP: 2 SOLUTION/ DROPS OPHTHALMIC at 05:07

## 2022-06-29 RX ADMIN — ACETAMINOPHEN 975 MG: 325 TABLET, FILM COATED ORAL at 22:10

## 2022-06-29 RX ADMIN — ACETAMINOPHEN 975 MG: 325 TABLET, FILM COATED ORAL at 13:25

## 2022-06-29 RX ADMIN — METHOCARBAMOL TABLETS 500 MG: 500 TABLET, COATED ORAL at 17:13

## 2022-06-29 RX ADMIN — ACETAMINOPHEN 975 MG: 325 TABLET, FILM COATED ORAL at 05:06

## 2022-06-29 RX ADMIN — PANTOPRAZOLE SODIUM 40 MG: 40 TABLET, DELAYED RELEASE ORAL at 08:32

## 2022-06-29 RX ADMIN — DORZOLAMIDE HYDROCHLORIDE AND TIMOLOL MALEATE 1 DROP: 20; 5 SOLUTION/ DROPS OPHTHALMIC at 08:32

## 2022-06-29 RX ADMIN — GABAPENTIN 100 MG: 100 CAPSULE ORAL at 22:10

## 2022-06-29 RX ADMIN — CEFAZOLIN SODIUM 2000 MG: 2 SOLUTION INTRAVENOUS at 05:06

## 2022-06-29 RX ADMIN — GABAPENTIN 100 MG: 100 CAPSULE ORAL at 17:13

## 2022-06-29 NOTE — PROGRESS NOTES
Progress Note - Margarita Rubio 80 y o  female MRN: [id number]    Unit/Bed#: S -01 Encounter: [id number]    Assessment and Plan:     1  Choledocholithiasis   ERCP performed yesterday with sphincterotomy and removal of stones/sludge  Significant improvement in liver function test today with complete resolution of bilirubin  White count also improving to 12  Hemoglobin stable  Reports some abdominal discomfort since surgery which has not worsened  Had a bowel movement this morning  Tolerating clear liquids     -can advance diet as tolerated  -continue to monitor abdominal exam   -monitor liver function tests and white count   -rest of care per surgical team    2  Duodenal ulcer  On ERCP yesterday noted to have diffuse superficial ulceration of the duodenum  H pylori testing was obtained on serology  -PPI b i d  for 3 months   -follow-up H pylori testing   -outpatient follow-up    ----------------------------------------------------------------------------------------------------------------    Subjective:     Patient reports she is feeling well  She has some abdominal discomfort around area of surgical site however reports it is not worsened since yesterday  She had a very small bowel movement this morning  No nausea or vomiting  Tolerating clear liquids  Objective:     Vitals: Blood pressure 151/69, pulse 66, temperature 98 4 °F (36 9 °C), resp  rate 16, height 4' 10" (1 473 m), weight 72 6 kg (160 lb), SpO2 96 %, not currently breastfeeding  ,Body mass index is 33 44 kg/m²  Intake/Output Summary (Last 24 hours) at 6/29/2022 1207  Last data filed at 6/29/2022 4109  Gross per 24 hour   Intake 1500 ml   Output 2000 ml   Net -500 ml       Physical Exam:     General Appearance: Alert, appears stated age and cooperative  Sitting comfortably in chair  Does not appear in distress  No jaundice noted    Lungs: Clear to auscultation bilaterally, no rales or rhonchi, no labored breathing/accessory muscle use  Heart: Regular rate and rhythm, S1, S2 normal, no murmur, click, rub or gallop  Abdomen: + reports some tenderness in the right upper quadrant area  No epigastric or left upper quadrant pain  Bowel sounds present  Extremities: No cyanosis, clubbing, or edema    Invasive Devices  Report    Peripheral Intravenous Line  Duration           Peripheral IV 06/27/22 Left Antecubital 1 day                Lab Results:  Results from last 7 days   Lab Units 06/29/22  0616   WBC Thousand/uL 12 03*   HEMOGLOBIN g/dL 12 2   HEMATOCRIT % 37 1   PLATELETS Thousands/uL 255   NEUTROS PCT % 87*   LYMPHS PCT % 7*   MONOS PCT % 5   EOS PCT % 0     Results from last 7 days   Lab Units 06/29/22  0616   POTASSIUM mmol/L 3 8   CHLORIDE mmol/L 108   CO2 mmol/L 18*   BUN mg/dL 21   CREATININE mg/dL 0 52*   CALCIUM mg/dL 7 7*   ALK PHOS U/L 193*   ALT U/L 233*   AST U/L 175*     Invalid input(s): BILI      Results from last 7 days   Lab Units 06/27/22  1216   LIPASE u/L <6*       Imaging Studies: I have personally reviewed pertinent imaging studies  ERCP    Result Date: 6/28/2022  Impression: As above RECOMMENDATION: Protonix bid Check H pylori antobodies Observe  Alena Rodríguez MD     CT abdomen pelvis wo contrast    Result Date: 6/27/2022  Impression: Cholelithiasis with CT findings consistent with acute cholecystitis  No biliary ductal dilatation  The study was marked in [facility] for immediate notification  Workstation performed: [id number]     XR chest portable    Result Date: 6/27/2022  Impression: No acute cardiopulmonary disease  Workstation performed: [id number]     MRI abdomen wo contrast and mrcp    Result Date: 6/28/2022  Impression: Evidence of choledocholithiasis  New, mild bile duct dilation compared to recent prior studies  Expected postoperative changes and nonemergent findings above   Workstation performed: [id number]     US right upper quadrant    Result Date: 6/27/2022  Impression: Gallstones and secondary signs of cholecystitis  No evidence of choledocholithiasis   Workstation performed: [id number]

## 2022-06-29 NOTE — PROGRESS NOTES
Progress Note - Katharine Berg 80 y o  female MRN: [id number]    Unit/Bed#: S -01 Encounter: [id number]      Assessment:  79 y/o F w acute on chronic cholecystitis s/p lap remberto on 6/27  Complicated by post op choledocholithiasis  S/p ERCP on 6/28 w clearing of duct  Vss  Afebrile  abd  Soft, non distended, non tender  T bili: 3 9-> 0 81    Plan:  Low fat diet  Ambulate  dvt ppx  Follow up bilirubin s/p ercp  Dc abx  Anticipate discharge home today    Subjective:   Feels great  + flatus  Denied fever, chills, chest pain, shortness of breath, nausea, vomiting, or abdominal pain this morning  Objective:     Vitals: Blood pressure 151/69, pulse 66, temperature 98 4 °F (36 9 °C), resp  rate 16, height 4' 10" (1 473 m), weight 72 6 kg (160 lb), SpO2 96 %, not currently breastfeeding  ,Body mass index is 33 44 kg/m²  Intake/Output Summary (Last 24 hours) at 6/29/2022 0813  Last data filed at 6/29/2022 0811  Gross per 24 hour   Intake 1500 ml   Output 2000 ml   Net -500 ml       Physical Exam  General: NAD  HEENT: NC/AT  MMM  Cv: RRR     Lungs: normal effort  Ab: Soft, NT/ND  Ex: no CCE  Neuro: AAOx3    Scheduled Meds:  Current Facility-Administered Medications   Medication Dose Route Frequency Provider Last Rate    acetaminophen  975 mg Oral Q8H Starr Oquendo MD      brimonidine tartrate  1 drop Both Eyes Q8H [address] Mary Santos MD      diphenhydrAMINE  12 5 mg Intravenous Once PRN Alecia Mcguire MD      dorzolamide-timolol  1 drop Both Eyes BID Mary Santos MD      heparin (porcine)  5,000 Units Subcutaneous [facility] Mary Santos MD      HYDROmorphone  0 5 mg Intravenous Q4H PRN Mary Santos MD      insulin lispro  1-5 Units Subcutaneous TID AC Mary Santos MD      lactated ringers  125 mL/hr Intravenous Continuous Mary Santos  mL/hr (06/28/22 2342)    levothyroxine  50 mcg Oral Early Morning Mary Santos MD      metoclopramide  10 mg Intravenous Once PRN Saba Hayden MD      ondansetron  4 mg Intravenous Q6H PRN Tsering Maier MD      ondansetron  4 mg Intravenous Once PRN Saba Hayden MD      oxyCODONE  10 mg Oral Q4H PRN Tsering Maier MD      oxyCODONE  5 mg Oral Q4H PRN Tsering Maier MD      pantoprazole  40 mg Oral BID AC Rachel Lowe PA-C       Continuous Infusions:lactated ringers, 125 mL/hr, Last Rate: 125 mL/hr (06/28/22 9392)      PRN Meds:   diphenhydrAMINE    HYDROmorphone    metoclopramide    ondansetron    ondansetron    oxyCODONE    oxyCODONE      Invasive Devices  Report    Peripheral Intravenous Line  Duration           Peripheral IV 06/27/22 Left Antecubital 1 day                Lab, Imaging and other studies: I have personally reviewed pertinent reports      VTE Pharmacologic Prophylaxis: Sequential compression device (Venodyne)   VTE Mechanical Prophylaxis: sequential compression device

## 2022-06-30 VITALS
TEMPERATURE: 98.5 F | HEART RATE: 64 BPM | SYSTOLIC BLOOD PRESSURE: 151 MMHG | WEIGHT: 160 LBS | BODY MASS INDEX: 33.58 KG/M2 | DIASTOLIC BLOOD PRESSURE: 83 MMHG | HEIGHT: 58 IN | OXYGEN SATURATION: 97 % | RESPIRATION RATE: 16 BRPM

## 2022-06-30 LAB
ALBUMIN SERPL BCP-MCNC: 2.9 G/DL (ref 3.5–5)
ALP SERPL-CCNC: 136 U/L (ref 34–104)
ALT SERPL W P-5'-P-CCNC: 104 U/L (ref 7–52)
AST SERPL W P-5'-P-CCNC: 43 U/L (ref 13–39)
BILIRUB DIRECT SERPL-MCNC: 0.11 MG/DL (ref 0–0.2)
BILIRUB SERPL-MCNC: 0.79 MG/DL (ref 0.2–1)
GLUCOSE SERPL-MCNC: 119 MG/DL (ref 65–140)
GLUCOSE SERPL-MCNC: 93 MG/DL (ref 65–140)
H PYLORI IGG SER IA-ACNC: 6.52 INDEX VALUE (ref 0–0.79)
H PYLORI IGM SER-ACNC: <9 UNITS (ref 0–8.9)
PROT SERPL-MCNC: 5.7 G/DL (ref 6.4–8.4)

## 2022-06-30 PROCEDURE — 82948 REAGENT STRIP/BLOOD GLUCOSE: CPT

## 2022-06-30 PROCEDURE — 99024 POSTOP FOLLOW-UP VISIT: CPT | Performed by: SURGERY

## 2022-06-30 PROCEDURE — NC001 PR NO CHARGE: Performed by: SURGERY

## 2022-06-30 PROCEDURE — 80076 HEPATIC FUNCTION PANEL: CPT | Performed by: PHYSICIAN ASSISTANT

## 2022-06-30 RX ORDER — PANTOPRAZOLE SODIUM 40 MG/1
40 TABLET, DELAYED RELEASE ORAL DAILY
Qty: 60 TABLET | Refills: 0 | Status: SHIPPED | OUTPATIENT
Start: 2022-06-30

## 2022-06-30 RX ORDER — ACETAMINOPHEN 325 MG/1
650 TABLET ORAL EVERY 6 HOURS PRN
Qty: 60 TABLET | Refills: 0 | Status: SHIPPED | OUTPATIENT
Start: 2022-06-30 | End: 2022-07-30

## 2022-06-30 RX ORDER — ACETAMINOPHEN 325 MG/1
650 TABLET ORAL EVERY 6 HOURS PRN
Refills: 0 | COMMUNITY
Start: 2022-06-30

## 2022-06-30 RX ADMIN — METHOCARBAMOL TABLETS 500 MG: 500 TABLET, COATED ORAL at 00:02

## 2022-06-30 RX ADMIN — ACETAMINOPHEN 975 MG: 325 TABLET, FILM COATED ORAL at 06:42

## 2022-06-30 RX ADMIN — DORZOLAMIDE HYDROCHLORIDE AND TIMOLOL MALEATE 1 DROP: 20; 5 SOLUTION/ DROPS OPHTHALMIC at 09:43

## 2022-06-30 RX ADMIN — LEVOTHYROXINE SODIUM 50 MCG: 50 TABLET ORAL at 06:42

## 2022-06-30 RX ADMIN — METHOCARBAMOL TABLETS 500 MG: 500 TABLET, COATED ORAL at 06:42

## 2022-06-30 RX ADMIN — PANTOPRAZOLE SODIUM 40 MG: 40 TABLET, DELAYED RELEASE ORAL at 06:42

## 2022-06-30 RX ADMIN — HEPARIN SODIUM 5000 UNITS: 5000 INJECTION INTRAVENOUS; SUBCUTANEOUS at 06:42

## 2022-06-30 RX ADMIN — LIDOCAINE 5% 2 PATCH: 700 PATCH TOPICAL at 09:41

## 2022-06-30 RX ADMIN — GABAPENTIN 100 MG: 100 CAPSULE ORAL at 09:41

## 2022-06-30 NOTE — PLAN OF CARE
Problem: Potential for Falls  Goal: Patient will remain free of falls  Description: INTERVENTIONS:  - Educate patient/family on patient safety including physical limitations  - Instruct patient to call for assistance with activity   - Consult OT/PT to assist with strengthening/mobility   - Keep Call bell within reach  - Keep bed low and locked with side rails adjusted as appropriate  - Keep care items and personal belongings within reach  - Initiate and maintain comfort rounds  - Make Fall Risk Sign visible to staff  - Offer Toileting every  Hours, in advance of need  - Initiate/Maintain alarm  - Obtain necessary fall risk management equipment:   - Apply yellow socks and bracelet for high fall risk patients  - Consider moving patient to room near nurses station  Outcome: Progressing     Problem: PAIN - ADULT  Goal: Verbalizes/displays adequate comfort level or baseline comfort level  Description: Interventions:  - Encourage patient to monitor pain and request assistance  - Assess pain using appropriate pain scale  - Administer analgesics based on type and severity of pain and evaluate response  - Implement non-pharmacological measures as appropriate and evaluate response  - Consider cultural and social influences on pain and pain management  - Notify physician/advanced practitioner if interventions unsuccessful or patient reports new pain  Outcome: Progressing     Problem: INFECTION - ADULT  Goal: Absence or prevention of progression during hospitalization  Description: INTERVENTIONS:  - Assess and monitor for signs and symptoms of infection  - Monitor lab/diagnostic results  - Monitor all insertion sites, i e  indwelling lines, tubes, and drains  - Monitor endotracheal if appropriate and nasal secretions for changes in amount and color  - [place] appropriate cooling/warming therapies per order  - Administer medications as ordered  - Instruct and encourage patient and family to use good hand hygiene technique  - Identify and instruct in appropriate isolation precautions for identified infection/condition  Outcome: Progressing  Goal: Absence of fever/infection during neutropenic period  Description: INTERVENTIONS:  - Monitor WBC    Outcome: Progressing     Problem: SAFETY ADULT  Goal: Patient will remain free of falls  Description: INTERVENTIONS:  - Educate patient/family on patient safety including physical limitations  - Instruct patient to call for assistance with activity   - Consult OT/PT to assist with strengthening/mobility   - Keep Call bell within reach  - Keep bed low and locked with side rails adjusted as appropriate  - Keep care items and personal belongings within reach  - Initiate and maintain comfort rounds  - Make Fall Risk Sign visible to staff  - Offer Toileting every  Hours, in advance of need  - Initiate/Maintain alarm  - Obtain necessary fall risk management equipment:   - Apply yellow socks and bracelet for high fall risk patients  - Consider moving patient to room near nurses station  Outcome: Progressing  Goal: Maintain or return to baseline ADL function  Description: INTERVENTIONS:  -  Assess patient's ability to carry out ADLs; assess patient's baseline for ADL function and identify physical deficits which impact ability to perform ADLs (bathing, care of mouth/teeth, toileting, grooming, dressing, etc )  - Assess/evaluate cause of self-care deficits   - Assess range of motion  - Assess patient's mobility; develop plan if impaired  - Assess patient's need for assistive devices and provide as appropriate  - Encourage maximum independence but intervene and supervise when necessary  - Involve family in performance of ADLs  - Assess for home care needs following discharge   - Consider OT consult to assist with ADL evaluation and planning for discharge  - Provide patient education as appropriate  Outcome: Progressing  Goal: Maintains/Returns to pre admission functional level  Description: INTERVENTIONS:  - Perform BMAT or MOVE assessment daily    - Set and communicate daily mobility goal to care team and patient/family/caregiver  - Collaborate with rehabilitation services on mobility goals if consulted  - Perform Range of Motion  times a day  - Reposition patient every  hours  - Dangle patient  times a day  - Stand patient  times a day  - Ambulate patient  times a day  - Out of bed to chair  times a day   - Out of bed for meals times a day  - Out of bed for toileting  - Record patient progress and toleration of activity level   Outcome: Progressing     Problem: DISCHARGE PLANNING  Goal: Discharge to home or other facility with appropriate resources  Description: INTERVENTIONS:  - Identify barriers to discharge w/patient and caregiver  - Arrange for needed discharge resources and transportation as appropriate  - Identify discharge learning needs (meds, wound care, etc )  - Arrange for interpretive services to assist at discharge as needed  - Refer to Case Management Department for coordinating discharge planning if the patient needs post-hospital services based on physician/advanced practitioner order or complex needs related to functional status, cognitive ability, or social support system  Outcome: Progressing     Problem: Knowledge Deficit  Goal: Patient/family/caregiver demonstrates understanding of disease process, treatment plan, medications, and discharge instructions  Description: Complete learning assessment and assess knowledge base    Interventions:  - Provide teaching at level of understanding  - Provide teaching via preferred learning methods  Outcome: Progressing

## 2022-06-30 NOTE — DISCHARGE SUMMARY
Discharge Summary - General Surgery   Javier Lowery 80 y o  female MRN: [id number]  Unit/Bed#: S -01 Encounter: [id number]    Admission Date: 6/27/2022     Discharge Date:  6/30/22     Admitting Diagnosis: Acute cholecystitis [K81 0]  Cholecystitis [K81 9]  Abdominal pain [R10 9]    Discharge Diagnosis: Principal Problem:    Acute cholecystitis  Active Problems:    Lymphoma ([address] )    Mixed hyperlipidemia    Obesity  Resolved Problems:    * No resolved hospital problems  *      Attending and Service:   Marcela Huerta,*; General Surgery    Consulting Physician(s):   GI    Procedures Performed:   Procedure(s) (LRB):  CHOLECYSTECTOMY LAPAROSCOPIC (N/A)      Imaging:  No results found  Hospital Course:   HPI, per Raya Avery MD: "Javier Lowery is a 80 y o  female past medical history of lymphoma, prediabetes, anemia, hyperlipidemia surgical history of total abdominal hysterectomy oophorectomy, appendectomy, 30 years ago, who presents with remote history of biliary colic  However patient reports that she has developed severe sharp right upper quadrant pain that is been persistent for the past 2 days  Movement makes the pain worse  She has not eaten in 2 days  Endorsed intermittent fever and chills  Denied any current chest pain or shortness of breath  Denied history of MI or stroke  She does not take any anticoagulation or anti-platelet  Denied history of blood clots "    The patient was taken to the operating room on 6/27/22 for performance of the above-stated procedures  Intraoperative findings included:   Severely inflamed gallbladder with omental adhesions  Severe inflammation of hepatocystic triangle, transection of cystic artery excised while obtaining the critical view  Cystic artery was triply clipped proximally  Cystic duct identified, triply clipped and excised  Post operatively patient had a rise in bilirubin  MRCP showed choledocholithiasis  ERCP performed by GI on 6/28  Afterwards patient did well with normalization lab values  Patient was discharged on HD#3  On the day of discharge, the patient was voiding spontaneously, ambulating at baseline, and pain was well controlled  She understood all instructions for discharge  She was also given the names and numbers of the providers as well as instructions for follow up appointments  Condition at Discharge: good     Discharge instructions/Information to patient and family:   See after visit summary for information provided to patient and family  Provisions for Follow-Up Care:  See after visit summary for information related to follow-up care and any pertinent home health orders  Disposition: See After Visit Summary for discharge disposition information  Planned Readmission: No    Discharge Statement   I spent 30 minutes discharging the patient  This time was spent on the day of discharge  I had direct contact with the patient on the day of discharge  Additional documentation is required if more than 30 minutes were spent on discharge  Discharge Medications:  See after visit summary for reconciled discharge medications provided to patient and family        Farrah Perdomo MD  6/30/2022  9:43 AM

## 2022-06-30 NOTE — PLAN OF CARE
Problem: Potential for Falls  Goal: Patient will remain free of falls  Description: INTERVENTIONS:  - Educate patient/family on patient safety including physical limitations  - Instruct patient to call for assistance with activity   - Consult OT/PT to assist with strengthening/mobility   - Keep Call bell within reach  - Keep bed low and locked with side rails adjusted as appropriate  - Keep care items and personal belongings within reach  - Initiate and maintain comfort rounds  - Make Fall Risk Sign visible to staff  - Offer Toileting every  Hours, in advance of need  - Initiate/Maintain alarm  - Obtain necessary fall risk management equipment:   - Apply yellow socks and bracelet for high fall risk patients  - Consider moving patient to room near nurses station  6/30/2022 1200 by Nigel Smith RN  Outcome: Adequate for Discharge  6/30/2022 6566 by Nigel Smith RN  Outcome: Progressing     Problem: PAIN - ADULT  Goal: Verbalizes/displays adequate comfort level or baseline comfort level  Description: Interventions:  - Encourage patient to monitor pain and request assistance  - Assess pain using appropriate pain scale  - Administer analgesics based on type and severity of pain and evaluate response  - Implement non-pharmacological measures as appropriate and evaluate response  - Consider cultural and social influences on pain and pain management  - Notify physician/advanced practitioner if interventions unsuccessful or patient reports new pain  6/30/2022 1200 by Nigel Smith RN  Outcome: Adequate for Discharge  6/30/2022 9060 by Nigel Smith RN  Outcome: Progressing     Problem: INFECTION - ADULT  Goal: Absence or prevention of progression during hospitalization  Description: INTERVENTIONS:  - Assess and monitor for signs and symptoms of infection  - Monitor lab/diagnostic results  - Monitor all insertion sites, i e  indwelling lines, tubes, and drains  - Monitor endotracheal if appropriate and nasal secretions for changes in amount and color  - [place] appropriate cooling/warming therapies per order  - Administer medications as ordered  - Instruct and encourage patient and family to use good hand hygiene technique  - Identify and instruct in appropriate isolation precautions for identified infection/condition  6/30/2022 1200 by Neftaly Hathaway RN  Outcome: Adequate for Discharge  6/30/2022 9631 by Neftaly Hathaway RN  Outcome: Progressing  Goal: Absence of fever/infection during neutropenic period  Description: INTERVENTIONS:  - Monitor WBC    6/30/2022 1200 by Neftaly Hathaway RN  Outcome: Adequate for Discharge  6/30/2022 0165 by Neftaly Hathaway RN  Outcome: Progressing     Problem: SAFETY ADULT  Goal: Patient will remain free of falls  Description: INTERVENTIONS:  - Educate patient/family on patient safety including physical limitations  - Instruct patient to call for assistance with activity   - Consult OT/PT to assist with strengthening/mobility   - Keep Call bell within reach  - Keep bed low and locked with side rails adjusted as appropriate  - Keep care items and personal belongings within reach  - Initiate and maintain comfort rounds  - Make Fall Risk Sign visible to staff  - Offer Toileting every  Hours, in advance of need  - Initiate/Maintain alarm  - Obtain necessary fall risk management equipment:   - Apply yellow socks and bracelet for high fall risk patients  - Consider moving patient to room near nurses station  6/30/2022 1200 by Neftaly Hathaway RN  Outcome: Adequate for Discharge  6/30/2022 6010 by Neftaly Hathaway RN  Outcome: Progressing  Goal: Maintain or return to baseline ADL function  Description: INTERVENTIONS:  -  Assess patient's ability to carry out ADLs; assess patient's baseline for ADL function and identify physical deficits which impact ability to perform ADLs (bathing, care of mouth/teeth, toileting, grooming, dressing, etc )  - Assess/evaluate cause of self-care deficits   - Assess range of motion  - Assess patient's mobility; develop plan if impaired  - Assess patient's need for assistive devices and provide as appropriate  - Encourage maximum independence but intervene and supervise when necessary  - Involve family in performance of ADLs  - Assess for home care needs following discharge   - Consider OT consult to assist with ADL evaluation and planning for discharge  - Provide patient education as appropriate  6/30/2022 1200 by Andreea Flanagan RN  Outcome: Adequate for Discharge  6/30/2022 8614 by Andreea Flanagan RN  Outcome: Progressing  Goal: Maintains/Returns to pre admission functional level  Description: INTERVENTIONS:  - Perform BMAT or MOVE assessment daily    - Set and communicate daily mobility goal to care team and patient/family/caregiver  - Collaborate with rehabilitation services on mobility goals if consulted  - Perform Range of Motion  times a day  - Reposition patient every hours    - Dangle patient  times a day  - Stand patient  times a day  - Ambulate patient  times a day  - Out of bed to chair  times a day   - Out of bed for meal times a day  - Out of bed for toileting  - Record patient progress and toleration of activity level   6/30/2022 1200 by Andreea Flanagan RN  Outcome: Adequate for Discharge  6/30/2022 7113 by Andreea Flanagan RN  Outcome: Progressing     Problem: DISCHARGE PLANNING  Goal: Discharge to home or other facility with appropriate resources  Description: INTERVENTIONS:  - Identify barriers to discharge w/patient and caregiver  - Arrange for needed discharge resources and transportation as appropriate  - Identify discharge learning needs (meds, wound care, etc )  - Arrange for interpretive services to assist at discharge as needed  - Refer to Case Management Department for coordinating discharge planning if the patient needs post-hospital services based on physician/advanced practitioner order or complex needs related to functional status, cognitive ability, or social support system  6/30/2022 1200 by Ronit Velazco RN  Outcome: Adequate for Discharge  6/30/2022 1930 by Ronit Velazco RN  Outcome: Progressing     Problem: Knowledge Deficit  Goal: Patient/family/caregiver demonstrates understanding of disease process, treatment plan, medications, and discharge instructions  Description: Complete learning assessment and assess knowledge base    Interventions:  - Provide teaching at level of understanding  - Provide teaching via preferred learning methods  6/30/2022 1200 by Ronit Velazco RN  Outcome: Adequate for Discharge  6/30/2022 3670 by Ronit Velazco RN  Outcome: Progressing

## 2022-06-30 NOTE — PLAN OF CARE
Problem: Potential for Falls  Goal: Patient will remain free of falls  Description: INTERVENTIONS:  - Educate patient/family on patient safety including physical limitations  - Instruct patient to call for assistance with activity   - Consult OT/PT to assist with strengthening/mobility   - Keep Call bell within reach  - Keep bed low and locked with side rails adjusted as appropriate  - Keep care items and personal belongings within reach  - Initiate and maintain comfort rounds  - Make Fall Risk Sign visible to staff  - Offer Toileting every 2 Hours, in advance of need  - Initiate/Maintain alarm  - Obtain necessary fall risk management equipment  - Apply yellow socks and bracelet for high fall risk patients  - Consider moving patient to room near nurses station  Outcome: Progressing     Problem: PAIN - ADULT  Goal: Verbalizes/displays adequate comfort level or baseline comfort level  Description: Interventions:  - Encourage patient to monitor pain and request assistance  - Assess pain using appropriate pain scale  - Administer analgesics based on type and severity of pain and evaluate response  - Implement non-pharmacological measures as appropriate and evaluate response  - Consider cultural and social influences on pain and pain management  - Notify physician/advanced practitioner if interventions unsuccessful or patient reports new pain  Outcome: Progressing     Problem: INFECTION - ADULT  Goal: Absence or prevention of progression during hospitalization  Description: INTERVENTIONS:  - Assess and monitor for signs and symptoms of infection  - Monitor lab/diagnostic results  - Monitor all insertion sites, i e  indwelling lines, tubes, and drains  - Monitor endotracheal if appropriate and nasal secretions for changes in amount and color  - [place] appropriate cooling/warming therapies per order  - Administer medications as ordered  - Instruct and encourage patient and family to use good hand hygiene technique  - Identify and instruct in appropriate isolation precautions for identified infection/condition  Outcome: Progressing  Goal: Absence of fever/infection during neutropenic period  Description: INTERVENTIONS:  - Monitor WBC    Outcome: Progressing     Problem: SAFETY ADULT  Goal: Patient will remain free of falls  Description: INTERVENTIONS:  - Educate patient/family on patient safety including physical limitations  - Instruct patient to call for assistance with activity   - Consult OT/PT to assist with strengthening/mobility   - Keep Call bell within reach  - Keep bed low and locked with side rails adjusted as appropriate  - Keep care items and personal belongings within reach  - Initiate and maintain comfort rounds  - Make Fall Risk Sign visible to staff  - Offer Toileting every 2 Hours, in advance of need  - Initiate/Maintain alarm  - Obtain necessary fall risk management equipment  - Apply yellow socks and bracelet for high fall risk patients  - Consider moving patient to room near nurses station  Outcome: Progressing  Goal: Maintain or return to baseline ADL function  Description: INTERVENTIONS:  -  Assess patient's ability to carry out ADLs; assess patient's baseline for ADL function and identify physical deficits which impact ability to perform ADLs (bathing, care of mouth/teeth, toileting, grooming, dressing, etc )  - Assess/evaluate cause of self-care deficits   - Assess range of motion  - Assess patient's mobility; develop plan if impaired  - Assess patient's need for assistive devices and provide as appropriate  - Encourage maximum independence but intervene and supervise when necessary  - Involve family in performance of ADLs  - Assess for home care needs following discharge   - Consider OT consult to assist with ADL evaluation and planning for discharge  - Provide patient education as appropriate  Outcome: Progressing  Goal: Maintains/Returns to pre admission functional level  Description: INTERVENTIONS:  - Perform BMAT or MOVE assessment daily    - Set and communicate daily mobility goal to care team and patient/family/caregiver  - Collaborate with rehabilitation services on mobility goals if consulted  - Perform Range of Motion 3 times a day  - Reposition patient every 2 hours  - Dangle patient 3 times a day  - Stand patient 3 times a day  - Ambulate patient 3 times a day  - Out of bed to chair 3 times a day   - Out of bed for meals 3 times a day  - Out of bed for toileting  - Record patient progress and toleration of activity level   Outcome: Progressing     Problem: DISCHARGE PLANNING  Goal: Discharge to home or other facility with appropriate resources  Description: INTERVENTIONS:  - Identify barriers to discharge w/patient and caregiver  - Arrange for needed discharge resources and transportation as appropriate  - Identify discharge learning needs (meds, wound care, etc )  - Arrange for interpretive services to assist at discharge as needed  - Refer to Case Management Department for coordinating discharge planning if the patient needs post-hospital services based on physician/advanced practitioner order or complex needs related to functional status, cognitive ability, or social support system  Outcome: Progressing     Problem: Knowledge Deficit  Goal: Patient/family/caregiver demonstrates understanding of disease process, treatment plan, medications, and discharge instructions  Description: Complete learning assessment and assess knowledge base    Interventions:  - Provide teaching at level of understanding  - Provide teaching via preferred learning methods  Outcome: Progressing

## 2022-06-30 NOTE — PROGRESS NOTES
Progress Note    Margarita Rubio 80 y o  female MRN: [id number]  Unit/Bed#: S -72 Encounter: [id number]    Assessment:  81 y/o F w acute on chronic cholecystitis s/p lap remberto on 6/27  Complicated by post op choledocholithiasis  S/p ERCP on 6/28 w clearing of duct  AVSS ra; afebrile  PLAN:  - low fat diet   - OOB/ambulate  - dc home today  Subjective:   - no new complaints this morning  Objective:     Vitals: Temp:  [98 5 °F (36 9 °C)-98 9 °F (37 2 °C)] 98 5 °F (36 9 °C)  HR:  [62-71] 64  Resp:  [16-18] 16  BP: (125-173)/(67-87) 151/83  Body mass index is 33 44 kg/m²  I/O       06/28 0701  06/29 0700 06/29 0701  06/30 0700 06/30 0701  07/01 0700    I V  (mL/kg) 1500 (20 7)      IV Piggyback       Total Intake(mL/kg) 1500 (20 7)      Urine (mL/kg/hr) 1700 (1) 1400 (0 8)     Stool  0     Blood       Total Output 1700 1400     Net -200 -1400            Unmeasured Urine Occurrence  1 x     Unmeasured Stool Occurrence  1 x           Physical Exam:  GEN: NAD  HEENT: atraumatic  CV: RRR  Lung: Normal effort  Ab: Soft, appro tender  Incisions cdi  Extrem: No CCE  Neuro: A+Ox3    Lab, Imaging and other studies: I have personally reviewed pertinent reports    , CBC with diff: No results found for: WBC, HGB, HCT, MCV, PLT, ADJUSTEDWBC, MCH, MCHC, RDW, MPV, NRBC, BMP/CMP: No results found for: SODIUM, K, CL, CO2, ANIONGAP, BUN, CREATININE, GLUCOSE, CALCIUM, AST, ALT, ALKPHOS, PROT, BILITOT, EGFR  VTE Pharmacologic Prophylaxis: Heparin  VTE Mechanical Prophylaxis: sequential compression device

## 2022-07-11 NOTE — ANESTHESIA POSTPROCEDURE EVALUATION
Post-Op Assessment Note             Reason for prolonged intubation > 24 hours:  N/A      No complications documented      BP      Temp      Pulse     Resp      SpO2

## 2022-07-18 ENCOUNTER — OFFICE VISIT (OUTPATIENT)
Dept: SURGERY | Facility: CLINIC | Age: 72
End: 2022-07-18

## 2022-07-18 VITALS — HEIGHT: 58 IN | BODY MASS INDEX: 33.5 KG/M2 | TEMPERATURE: 96.3 F | WEIGHT: 159.6 LBS

## 2022-07-18 DIAGNOSIS — Z09 POSTOP CHECK: Primary | ICD-10-CM

## 2022-07-18 PROCEDURE — 99024 POSTOP FOLLOW-UP VISIT: CPT | Performed by: STUDENT IN AN ORGANIZED HEALTH CARE EDUCATION/TRAINING PROGRAM

## 2022-07-18 NOTE — PATIENT INSTRUCTIONS
Please take protonix 40mg twice per day for 3 months total  Follow up with your primary care physician and the GI doctors

## 2022-11-04 ENCOUNTER — OFFICE VISIT (OUTPATIENT)
Dept: GASTROENTEROLOGY | Facility: CLINIC | Age: 72
End: 2022-11-04

## 2022-11-04 ENCOUNTER — TELEPHONE (OUTPATIENT)
Dept: GASTROENTEROLOGY | Facility: CLINIC | Age: 72
End: 2022-11-04

## 2022-11-04 VITALS
TEMPERATURE: 98.6 F | SYSTOLIC BLOOD PRESSURE: 141 MMHG | WEIGHT: 166 LBS | DIASTOLIC BLOOD PRESSURE: 80 MMHG | HEART RATE: 77 BPM | BODY MASS INDEX: 34.85 KG/M2 | HEIGHT: 58 IN

## 2022-11-04 DIAGNOSIS — K26.9 DUODENAL ULCER: ICD-10-CM

## 2022-11-04 DIAGNOSIS — A04.8 H. PYLORI INFECTION: Primary | ICD-10-CM

## 2022-11-04 DIAGNOSIS — K80.50 CHOLEDOCHOLITHIASIS: ICD-10-CM

## 2022-11-04 DIAGNOSIS — Z12.11 COLON CANCER SCREENING: ICD-10-CM

## 2022-11-04 RX ORDER — METRONIDAZOLE 250 MG/1
250 TABLET ORAL EVERY 6 HOURS
Qty: 56 TABLET | Refills: 0 | Status: SHIPPED | OUTPATIENT
Start: 2022-11-04 | End: 2022-11-18

## 2022-11-04 RX ORDER — BISMUTH SUBSALICYLATE 262 MG/1
524 TABLET, CHEWABLE ORAL EVERY 6 HOURS
Qty: 112 TABLET | Refills: 0 | Status: SHIPPED | OUTPATIENT
Start: 2022-11-04 | End: 2022-11-18

## 2022-11-04 RX ORDER — TETRACYCLINE HYDROCHLORIDE 500 MG/1
500 CAPSULE ORAL EVERY 6 HOURS
Qty: 56 CAPSULE | Refills: 0 | Status: SHIPPED | OUTPATIENT
Start: 2022-11-04 | End: 2022-11-18

## 2022-11-04 RX ORDER — PANTOPRAZOLE SODIUM 40 MG/1
40 TABLET, DELAYED RELEASE ORAL EVERY 12 HOURS
Qty: 28 TABLET | Refills: 0 | Status: SHIPPED | OUTPATIENT
Start: 2022-11-04 | End: 2022-11-18

## 2022-11-04 NOTE — TELEPHONE ENCOUNTER
Spoke with patient advising Naomi Hart from [place] referrals is in a different office than where she was referred from  Patient will try to scan referral on [address]

## 2022-11-04 NOTE — PROGRESS NOTES
[facility] - Outpatient Follow-up Note  Jasmine Barker 80 y o  female MRN: [id number]  Encounter: [id number]      Assessment and Plan    1  Duodenal ulcer  See on EGD during ERCP 6/28/22  H pylori antibody IgM normal but IgG elevated to 6 52  She is on Protonix 40 mg twice daily doing well  - discussed with the patient that given she is on Protonix her H pylori stool antigen will likely be falsely negative, we discussed that given she had abnormal h pylori blood work and a duodenal ulcer we could treat her for H pylori and she is agreeable to this at this time, we reviewed quadruple therapy in detail and medications were fax[facility] to [facility], she is aware to discontinue Protonix after this 14 day treatment  - 1 month after completing treatment she will obtain an H pylori stool antigen to confirm eradication    2  Choledocholithiasis  Found on MRI/MRCP during her hospitalization in June  She is s/p ERCP with sphincterotomy and removal of stones/sludge  Live enzymes following had begun to normalize following   - obtain CMP to ensure normalization of CMP     3  Colon cancer screening  Last colonoscopy 2019 with 2 polyps removed, one of which was a sessile serrated adenoma, and small internal hemorrhoids  Repeat was recommended 5 years later  - due for colonoscopy 2024 or sooner if clinically indicated     Follow up as needed    ______________________________________________________________________    History of Present Illness  Jasmine Barker is a 80 y o  female here for follow up evaluation of hospitalization  In June the patient presented to the hospital secondary to abdominal pain  She was found to have acute cholecystitis and underwent laparoscopic cholecystectomy  Following her cholecystectomy should elevation in her liver enzymes prompting MRCP which showed common bile duct stones  She underwent ERCP with sphincterotomy and removal of stone/sludge    Following this her liver enzymes have normalized  During your CP she was also found to have an ulcer in her duodenum  She had H pylori antibody blood work done revealing a normal IgM but an elevated IgG to 6 52  She is currently on Protonix 40 mg twice daily and doing well on this  Review of Systems   Constitutional: Negative for activity change, appetite change, chills, fatigue, fever and unexpected weight change  Gastrointestinal: Negative for abdominal distention, abdominal pain, anal bleeding, blood in stool, constipation, diarrhea, nausea, rectal pain and vomiting  Past Medical History  Past Medical History:   Diagnosis Date   • Abnormal results of thyroid function studies    • Age-related osteoporosis without current pathological fracture    • Anemia    • Diffuse large B-cell lymphoma (HCC)     diffuse large B-cell lymphoma unspecified sit   • Edema    • History of chemotherapy 10/01/2013   • Hyperthyroidism    • Impaired fasting glucose    • Lymphoma ([facility] )     pt stated 5 years remission   • Mixed hyperlipidemia    • Non Hodgkin's lymphoma ([facility] ) 07/01/2013    unspecified, unspecified sit   • Nuclear age-related cataract, both eyes    • Obesity    • Ocular hypertension, bilateral    • Prediabetes    • Puckering of macula, left eye    • Tremor     unspecified       Past Social history  Past Surgical History:   Procedure Laterality Date   • APPENDECTOMY     • CHOLECYSTECTOMY LAPAROSCOPIC N/A 6/27/2022    Procedure: CHOLECYSTECTOMY LAPAROSCOPIC;  Surgeon: Marlyn Bernabe DO;  Location: [facility];  Service: General   • HIP FRACTURE SURGERY     • HYSTERECTOMY      @ age 40   • OOPHORECTOMY      @age 40   • [place] COLONOSCOPY FLX DX W/COLLJ SPEC WHEN PFRMD N/A 4/2/2019    Procedure: COLONOSCOPY;  Surgeon: Kelly Mullen MD;  Location: [facility];   Service: Gastroenterology     Social History     Socioeconomic History   • Marital status: Single     Spouse name: Not on file   • Number of children: Not on file   • Years of education: Not on file   • Highest education level: Not on file   Occupational History   • Not on file   Tobacco Use   • Smoking status: Never Smoker   • Smokeless tobacco: Never Used   Vaping Use   • Vaping Use: Never used   Substance and Sexual Activity   • Alcohol use: Yes     Comment: oscional   • Drug use: Never   • Sexual activity: Not on file   Other Topics Concern   • Not on file   Social History Narrative   • Not on file     Social Determinants of Health     Financial Resource Strain: Not on file   Food Insecurity: Not on file   Transportation Needs: Not on file   Physical Activity: Not on file   Stress: Not on file   Social Connections: Not on file   Intimate Partner Violence: Not on file   Housing Stability: Not on file     Social History     Substance and Sexual Activity   Alcohol Use Yes    Comment: oscional     Social History     Substance and Sexual Activity   Drug Use Never     Social History     Tobacco Use   Smoking Status Never Smoker   Smokeless Tobacco Never Used       Past Family History  Family History   Problem Relation Age of Onset   • Cancer Mother         ? esophageal   • Hypertension Mother    • Lung cancer Father    • Glaucoma Father    • Breast cancer Maternal Aunt 79   • Stomach cancer Sister    • Liver cancer Sister    • No Known Problems Maternal Grandmother    • No Known Problems Maternal Grandfather    • No Known Problems Paternal Grandmother    • No Known Problems Paternal Grandfather    • Thyroid cancer Sister    • No Known Problems Sister    • No Known Problems Sister    • No Known Problems Paternal Aunt        Current Medications  Current Outpatient Medications   Medication Sig Dispense Refill   • brimonidine tartrate 0 2 % ophthalmic solution 1 drop     • Cholecalciferol (VITAMIN D3) 5000 units CAPS Take 1 capsule by mouth daily     • Denosumab (PROLIA SC) Inject under the skin     • Dorzolamide HCl-Timolol Mal PF 22 3-6 8 MG/ML SOLN Apply to eye     • Empagliflozin 25 MG TABS Take 1 5 tablets by mouth daily     • ibuprofen (MOTRIN) 400 mg tablet Take by mouth     • Levothyroxine Sodium (SYNTHROID PO) Take 0 05 mg by mouth     • multivitamin (THERAGRAN) TABS Take 1 tablet by mouth daily     • pantoprazole (PROTONIX) 40 mg tablet Take 1 tablet (40 mg total) by mouth daily 60 tablet 0   • acetaminophen (TYLENOL) 325 mg tablet Take 2 tablets (650 mg total) by mouth every 6 (six) hours as needed for mild pain or moderate pain (Patient not taking: No sig reported)  0   • B Complex Vitamins (VITAMIN B COMPLEX PO) Take by mouth (Patient not taking: No sig reported)     • GLUCOSAMINE-CHONDROITIN-MSM PO Take by mouth (Patient not taking: No sig reported)       No current facility-administered medications for this visit  Allergies  Allergies   Allergen Reactions   • Metformin Anaphylaxis   • Adhesive [Medical Tape]      Pt stated     • Cats Claw (Uncaria Tomentosa) Dizziness   • Darvon [Propoxyphene]      Pt stated     • Listerine Antiseptic [Antiseptic Mouth Rinse]    • Listerine Healthy White Gentle [Sodium Fluoride]      Pt stated listerine, listermint, with fluoride       • Seasonal Ic [Cholestatin] Diarrhea         The following portions of the patient's history were reviewed and updated as appropriate: allergies, current medications, past medical history, past social history, past surgical history and problem list       Vitals  Vitals:    11/04/22 1053   BP: 141/80   BP Location: Left arm   Patient Position: Sitting   Cuff Size: Adult   Pulse: 77   Temp: 98 6 °F (37 °C)   TempSrc: Tympanic   Weight: 75 3 kg (166 lb)   Height: 4' 10" (1 473 m)         Physical Exam  Constitutional   General appearance: Patient is seated and in no acute distress, well appearing and well nourished  Head and Face   Head and face: Normal     Eyes   Conjunctiva and lids: No erythema, swelling or discharge  Anicteric    Ears, Nose, Mouth, and Throat   Hearing: Normal     Neck: Supple, trachea midline  Pulmonary   Respiratory effort: No increased work of breathing or signs of respiratory distress  Cardiovascular   Examination of extremities for edema and/or varicosities: Normal     Musculoskeletal   Gait and station: Normal     Skin   Skin and subcutaneous tissue: Warm, dry, and intact  No visible jaundice, lesions or rashes  Psychiatric   Judgment and insight: Normal  Recent and remote memory:  Normal  Mood and affect: Normal       Results  No visits with results within 1 Day(s) from this visit     Latest known visit with results is:   Admission on 06/27/2022, Discharged on 06/30/2022   Component Date Value   • WBC 06/27/2022 24 29 (A)   • RBC 06/27/2022 5 43 (A)   • Hemoglobin 06/27/2022 16 2 (A)   • Hematocrit 06/27/2022 48 7 (A)   • MCV 06/27/2022 90    • MCH 06/27/2022 29 8    • MCHC 06/27/2022 33 3    • RDW 06/27/2022 14 2    • MPV 06/27/2022 11 3    • Platelets 41/57/2928 336    • nRBC 06/27/2022 0    • Neutrophils Relative 06/27/2022 82 (A)   • Immat GRANS % 06/27/2022 1    • Lymphocytes Relative 06/27/2022 7 (A)   • Monocytes Relative 06/27/2022 10    • Eosinophils Relative 06/27/2022 0    • Basophils Relative 06/27/2022 0    • Neutrophils Absolute 06/27/2022 19 87 (A)   • Immature Grans Absolute 06/27/2022 0 17    • Lymphocytes Absolute 06/27/2022 1 75    • Monocytes Absolute 06/27/2022 2 39 (A)   • Eosinophils Absolute 06/27/2022 0 01    • Basophils Absolute 06/27/2022 0 10    • Sodium 06/27/2022 137    • Potassium 06/27/2022 3 8    • Chloride 06/27/2022 102    • CO2 06/27/2022 19 (A)   • ANION GAP 06/27/2022 16 (A)   • BUN 06/27/2022 19    • Creatinine 06/27/2022 0 75    • Glucose 06/27/2022 140    • Calcium 06/27/2022 9 5    • AST 06/27/2022 18    • ALT 06/27/2022 18    • Alkaline Phosphatase 06/27/2022 66    • Total Protein 06/27/2022 8 0    • Albumin 06/27/2022 4 2    • Total Bilirubin 06/27/2022 1 61 (A)   • eGFR 06/27/2022 81    • Lipase 06/27/2022 <6 (A)   • Extra Tube 06/27/2022 Hold for add-ons  • Case Report 06/27/2022                      Value:Surgical Pathology Report                         Case: [id number]                                   Authorizing Provider:  Tawnya Bernabe DO          Collected:           06/27/2022 1856              Ordering Location:     [facility]        Received:            06/27/2022 [address] [facility]                                                      Pathologist:           Terri Farley                                                                 Specimen:    Gallbladder                                                                               • Final Diagnosis 06/27/2022                      Value: This result contains rich text formatting which cannot be displayed here  • Additional Information 06/27/2022                      Value: This result contains rich text formatting which cannot be displayed here  • Gross Description 06/27/2022                      Value: This result contains rich text formatting which cannot be displayed here     • Sodium 06/28/2022 140    • Potassium 06/28/2022 4 5    • Chloride 06/28/2022 108    • CO2 06/28/2022 17 (A)   • ANION GAP 06/28/2022 15 (A)   • BUN 06/28/2022 19    • Creatinine 06/28/2022 0 65    • Glucose 06/28/2022 100    • Calcium 06/28/2022 8 6    • Corrected Calcium 06/28/2022 9 1    • AST 06/28/2022 606 (A)   • ALT 06/28/2022 375 (A)   • Alkaline Phosphatase 06/28/2022 193 (A)   • Total Protein 06/28/2022 6 6    • Albumin 06/28/2022 3 4 (A)   • Total Bilirubin 06/28/2022 3 94 (A)   • eGFR 06/28/2022 90    • WBC 06/28/2022 17 66 (A)   • RBC 06/28/2022 4 67    • Hemoglobin 06/28/2022 13 8    • Hematocrit 06/28/2022 43 7    • MCV 06/28/2022 94    • MCH 06/28/2022 29 6    • MCHC 06/28/2022 31 6    • RDW 06/28/2022 14 3    • MPV 06/28/2022 11 5    • Platelets 18/15/9127 273    • nRBC 06/28/2022 0    • Neutrophils Relative 06/28/2022 85 (A)   • Immat GRANS % 06/28/2022 1    • Lymphocytes Relative 06/28/2022 7 (A)   • Monocytes Relative 06/28/2022 7    • Eosinophils Relative 06/28/2022 0    • Basophils Relative 06/28/2022 0    • Neutrophils Absolute 06/28/2022 14 97 (A)   • Immature Grans Absolute 06/28/2022 0 14    • Lymphocytes Absolute 06/28/2022 1 23    • Monocytes Absolute 06/28/2022 1 30 (A)   • Eosinophils Absolute 06/28/2022 0 00    • Basophils Absolute 06/28/2022 0 02    • POC Glucose 06/28/2022 105    • POC Glucose 06/28/2022 120    • POC Glucose 06/28/2022 109    • WBC 06/29/2022 12 03 (A)   • RBC 06/29/2022 4 08    • Hemoglobin 06/29/2022 12 2    • Hematocrit 06/29/2022 37 1    • MCV 06/29/2022 91    • MCH 06/29/2022 29 9    • MCHC 06/29/2022 32 9    • RDW 06/29/2022 14 4    • MPV 06/29/2022 11 3    • Platelets 15/13/4725 255    • nRBC 06/29/2022 0    • Neutrophils Relative 06/29/2022 87 (A)   • Immat GRANS % 06/29/2022 1    • Lymphocytes Relative 06/29/2022 7 (A)   • Monocytes Relative 06/29/2022 5    • Eosinophils Relative 06/29/2022 0    • Basophils Relative 06/29/2022 0    • Neutrophils Absolute 06/29/2022 10 48 (A)   • Immature Grans Absolute 06/29/2022 0 08    • Lymphocytes Absolute 06/29/2022 0 88    • Monocytes Absolute 06/29/2022 0 57    • Eosinophils Absolute 06/29/2022 0 00    • Basophils Absolute 06/29/2022 0 02    • Sodium 06/29/2022 137    • Potassium 06/29/2022 3 8    • Chloride 06/29/2022 108    • CO2 06/29/2022 18 (A)   • ANION GAP 06/29/2022 11    • BUN 06/29/2022 21    • Creatinine 06/29/2022 0 52 (A)   • Glucose 06/29/2022 157 (A)   • Calcium 06/29/2022 7 7 (A)   • Corrected Calcium 06/29/2022 8 4    • AST 06/29/2022 175 (A)   • ALT 06/29/2022 233 (A)   • Alkaline Phosphatase 06/29/2022 193 (A)   • Total Protein 06/29/2022 6 3 (A)   • Albumin 06/29/2022 3 1 (A)   • Total Bilirubin 06/29/2022 0 81    • eGFR 06/29/2022 97    • H Pylori IgG 06/29/2022 6 52 (A)   • H  pylori IgM 06/29/2022 <9 0    • POC Glucose 06/29/2022 176 (A)   • POC Glucose 06/29/2022 152 (A)   • POC Glucose 06/29/2022 194 (A)   • POC Glucose 06/30/2022 93    • Total Bilirubin 06/30/2022 0 79    • Bilirubin, Direct 06/30/2022 0 11    • Alkaline Phosphatase 06/30/2022 136 (A)   • AST 06/30/2022 43 (A)   • ALT 06/30/2022 104 (A)   • Total Protein 06/30/2022 5 7 (A)   • Albumin 06/30/2022 2 9 (A)   • POC Glucose 06/30/2022 119        Radiology Results  No results found  Orders  No orders of the defined types were placed in this encounter

## 2022-11-08 ENCOUNTER — TELEPHONE (OUTPATIENT)
Dept: GASTROENTEROLOGY | Facility: CLINIC | Age: 72
End: 2022-11-08

## 2022-11-08 NOTE — TELEPHONE ENCOUNTER
Called patient and advised referral she brought in was from July for General Surgery not Gastroenterology  Advised to call     [facility] [address]     And have them fax over referral for DOS 11/4/2022 to [phone]  She was driving- I emailed the above to her to email on file

## 2022-11-25 ENCOUNTER — TRANSCRIBE ORDERS (OUTPATIENT)
Dept: GASTROENTEROLOGY | Facility: CLINIC | Age: 72
End: 2022-11-25

## 2023-02-06 ENCOUNTER — HOSPITAL ENCOUNTER (EMERGENCY)
Facility: HOSPITAL | Age: 73
Discharge: HOME | End: 2023-02-06
Attending: EMERGENCY MEDICINE

## 2023-02-06 VITALS
SYSTOLIC BLOOD PRESSURE: 168 MMHG | RESPIRATION RATE: 18 BRPM | HEART RATE: 64 BPM | TEMPERATURE: 98 F | OXYGEN SATURATION: 100 % | DIASTOLIC BLOOD PRESSURE: 74 MMHG

## 2023-02-06 DIAGNOSIS — H20.9: Primary | ICD-10-CM

## 2023-02-06 DIAGNOSIS — H40.40X0: Primary | ICD-10-CM

## 2023-02-06 RX ORDER — PREDNISOLONE ACETATE 10 MG/ML
1 SUSPENSION/ DROPS OPHTHALMIC
Status: DISCONTINUED | OUTPATIENT
Start: 2023-02-06 | End: 2023-02-06 | Stop reason: HOSPADM

## 2023-02-06 RX ORDER — TIMOLOL MALEATE 5 MG/ML
1 SOLUTION/ DROPS OPHTHALMIC DAILY
Status: DISCONTINUED | OUTPATIENT
Start: 2023-02-07 | End: 2023-02-06 | Stop reason: HOSPADM

## 2023-02-06 RX ORDER — TIMOLOL MALEATE 5 MG/ML
1 SOLUTION/ DROPS OPHTHALMIC ONCE
Status: DISCONTINUED | OUTPATIENT
Start: 2023-02-06 | End: 2023-02-06 | Stop reason: HOSPADM

## 2023-02-06 RX ORDER — ACETAZOLAMIDE 250 MG/1
250 TABLET ORAL 4 TIMES DAILY
Status: DISCONTINUED | OUTPATIENT
Start: 2023-02-06 | End: 2023-02-06 | Stop reason: HOSPADM

## 2023-02-06 RX ADMIN — PREDNISOLONE ACETATE 1 DROP: 10 SUSPENSION/ DROPS OPHTHALMIC at 19:50

## 2023-02-06 RX ADMIN — ACETAZOLAMIDE 250 MG: 250 TABLET ORAL at 20:23

## 2023-02-06 NOTE — ED PROVIDER NOTES
History  Chief Complaint   Patient presents with   • Eye Problem     Patient directly from eye doctor for occular pressure of 50  Report halo's and cloudy vision  72 yo F history of ocular hypertension, lymphoma in remission, prediabetes, presenting with 3-week history of eye irritation and tearing  Patient was seen by an optometrist today who did fluorescein and pressure exam, told her right eye pressure was 50 and was instructed to go to the emergency department  Patient notes associated rainbow colored halos in vision, occasional haziness of vision, some very mild photophobia  Patient denies any headache, any pain with eye movement, any eye pain, foreign body sensation, fevers, nausea, vomiting, focal weakness, paresthesias, or any other signs or symptoms  Prior to Admission Medications   Prescriptions Last Dose Informant Patient Reported? Taking?    B Complex Vitamins (VITAMIN B COMPLEX PO)   Yes No   Sig: Take by mouth   Patient not taking: No sig reported   Cholecalciferol (VITAMIN D3) 5000 units CAPS   Yes No   Sig: Take 1 capsule by mouth daily   Denosumab (PROLIA SC)   Yes No   Sig: Inject under the skin   Dorzolamide HCl-Timolol Mal PF 22 3-6 8 MG/ML SOLN   Yes No   Sig: Apply to eye   Empagliflozin 25 MG TABS   Yes No   Sig: Take 1 5 tablets by mouth daily   GLUCOSAMINE-CHONDROITIN-MSM PO   Yes No   Sig: Take by mouth   Patient not taking: No sig reported   Levothyroxine Sodium (SYNTHROID PO)   Yes No   Sig: Take 0 05 mg by mouth   acetaminophen (TYLENOL) 325 mg tablet   No No   Sig: Take 2 tablets (650 mg total) by mouth every 6 (six) hours as needed for mild pain or moderate pain   Patient not taking: No sig reported   brimonidine tartrate 0 2 % ophthalmic solution   Yes No   Si drop   ibuprofen (MOTRIN) 400 mg tablet   Yes No   Sig: Take by mouth   multivitamin (THERAGRAN) TABS   Yes No   Sig: Take 1 tablet by mouth daily   pantoprazole (PROTONIX) 40 mg tablet   No No   Sig: Take 1 tablet (40 mg total) by mouth every 12 (twelve) hours for 14 days      Facility-Administered Medications: None       Past Medical History:   Diagnosis Date   • Abnormal results of thyroid function studies    • Age-related osteoporosis without current pathological fracture    • Anemia    • Diffuse large B-cell lymphoma (HCC)     diffuse large B-cell lymphoma unspecified sit   • Edema    • History of chemotherapy 10/01/2013   • Hyperthyroidism    • Impaired fasting glucose    • Lymphoma ([place] [address] )     pt stated 5 years remission   • Mixed hyperlipidemia    • Non Hodgkin's lymphoma ([facility] ) 07/01/2013    unspecified, unspecified sit   • Nuclear age-related cataract, both eyes    • Obesity    • Ocular hypertension, bilateral    • Prediabetes    • Puckering of macula, left eye    • Tremor     unspecified       Past Surgical History:   Procedure Laterality Date   • APPENDECTOMY     • CHOLECYSTECTOMY LAPAROSCOPIC N/A 6/27/2022    Procedure: CHOLECYSTECTOMY LAPAROSCOPIC;  Surgeon: Rajat Cruz To, DO;  Location: [facility] OR;  Service: General   • HIP FRACTURE SURGERY     • HYSTERECTOMY      @ age 40   • OOPHORECTOMY      @age 40   • IA COLONOSCOPY FLX DX W/COLLJ SPEC WHEN PFRMD N/A 4/2/2019    Procedure: COLONOSCOPY;  Surgeon: Dyana Williamson MD;  Location: [facility]  GI LAB; Service: Gastroenterology       Family History   Problem Relation Age of Onset   • Cancer Mother         ? esophageal   • Hypertension Mother    • Lung cancer Father    • Glaucoma Father    • Breast cancer Maternal Aunt 79   • Stomach cancer Sister    • Liver cancer Sister    • No Known Problems Maternal Grandmother    • No Known Problems Maternal Grandfather    • No Known Problems Paternal Grandmother    • No Known Problems Paternal Grandfather    • Thyroid cancer Sister    • No Known Problems Sister    • No Known Problems Sister    • No Known Problems Paternal Aunt      I have reviewed and agree with the history as documented      E-Cigarette/Vaping   • E-Cigarette Use Never User      E-Cigarette/Vaping Substances     Social History     Tobacco Use   • Smoking status: Never   • Smokeless tobacco: Never   Vaping Use   • Vaping Use: Never used   Substance Use Topics   • Alcohol use: Yes     Comment: oscional   • Drug use: Never        Review of Systems   Constitutional: Negative for chills and fever  HENT: Negative for ear pain and sore throat  Eyes: Positive for photophobia (very mild, only with bright lights), pain (irritation), redness and visual disturbance  Respiratory: Negative for cough and shortness of breath  Cardiovascular: Negative for chest pain and palpitations  Gastrointestinal: Negative for abdominal pain and vomiting  Genitourinary: Negative for dysuria and hematuria  Musculoskeletal: Negative for arthralgias and back pain  Skin: Negative for color change and rash  Neurological: Negative for dizziness, seizures, syncope, facial asymmetry, speech difficulty, weakness, light-headedness, numbness and headaches  All other systems reviewed and are negative  Physical Exam  ED Triage Vitals [02/06/23 1620]   Temperature Pulse Respirations Blood Pressure SpO2   98 °F (36 7 °C) 64 18 168/74 100 %      Temp Source Heart Rate Source Patient Position - Orthostatic VS BP Location FiO2 (%)   Oral Monitor Lying Right arm --      Pain Score       --             Orthostatic Vital Signs  Vitals:    02/06/23 1620   BP: 168/74   Pulse: 64   Patient Position - Orthostatic VS: Lying       Physical Exam  Vitals and nursing note reviewed  Constitutional:       General: She is not in acute distress  Appearance: She is well-developed  HENT:      Head: Normocephalic and atraumatic  Eyes:      General: Lids are normal       Intraocular pressure: Right eye pressure is 28 mmHg  Left eye pressure is 28 mmHg  Measurements were taken using an automated tonometer  Extraocular Movements: Extraocular movements intact        Conjunctiva/sclera:      Right eye: Right conjunctiva is injected  Left eye: Left conjunctiva is injected  Pupils: Pupils are equal, round, and reactive to light  Cardiovascular:      Rate and Rhythm: Normal rate and regular rhythm  Heart sounds: No murmur heard  Pulmonary:      Effort: Pulmonary effort is normal  No respiratory distress  Breath sounds: Normal breath sounds  Abdominal:      Palpations: Abdomen is soft  Tenderness: There is no abdominal tenderness  Musculoskeletal:         General: No swelling  Cervical back: Neck supple  Skin:     General: Skin is warm and dry  Capillary Refill: Capillary refill takes less than 2 seconds  Neurological:      Mental Status: She is alert  Psychiatric:         Mood and Affect: Mood normal          ED Medications  Medications - No data to display    Diagnostic Studies  Results Reviewed     None                 No orders to display         Procedures  Procedures      ED Course  ED Course as of 02/09/23 0844   [facility] Feb 06, 2023   1627 Blood Pressure: 168/74   1627 Temperature: 98 °F (36 7 °C)  Oral temp   1627 Pulse: 64   1627 Respirations: 18   1627 SpO2: 100 %   1627 70 yo F history of ocular hypertension, lymphoma in remission, prediabetes, presenting with 3-week history of eye irritation and tearing  Patient was seen by an optometrist today who did fluorescein and pressure exam, told her right eye pressure was 50 and was instructed to go to the emergency department  Patient notes associated rainbow colored halos in vision, occasional haziness of vision, some very mild photophobia  Patient denies any headache, any pain with eye movement, any eye pain, foreign body sensation, fevers, nausea, vomiting, focal weakness, paresthesias, or any other signs or symptoms      Physical exam: Injected sclera and conjunctiva bilaterally, pressures with Isidoro-Pen here in the emergency department 28 bilaterally x2, pupils are equal round and reactive to light, some mild cloudiness to glands on the right  Cranial nerves II through XII are intact, heart was regular rate and rhythm, lungs clear to auscultation bilaterally,    Concern for: Acute angle glaucoma versus corneal ulcer/keratitis versus conjunctivitis versus uveitis/iritis   0793 With ophthalmologist on-call, Dr Fronie Bamberger who will be in to evaluate the patient  He recommended giving timolol drops 0 5% 1 drop in each eye    1906 Dr Fronie Bamberger evaluated patient in the emergency department  For his Isidoro-Pen patient's right eye had a pressure of 48  Symptom presentation as progressive and not as severe as acute closure angle glaucoma, 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  Dr Edgar oHuse plan: Add timolol 1/2% every morning to the right eye  Pred forte 1 drop every 5 minutes x2 now and every 1 hour while awake until seen in Dr Edgar House office  Diamox 250 mg p o  now and then 4 times daily for 2 days  Discharge home  The patient will follow-up in Dr Edgar House office tomorrow morning  She is agreeable with plan  Patient was given the opportunity ask questions and emergency department  All questions and concerns were addressed the emergency department  Medical Decision Making  See ED course for more details of medical decision making    Nixon-Schlossman syndrome: acute illness or injury        Disposition  Final diagnoses:   Nixon-Schlossman syndrome     Time reflects when diagnosis was documented in both MDM as applicable and the Disposition within this note     Time User Action Codes Description Comment    2/6/2023  7:31 PM Anayeli Santana Add [H40 40X0,  H20 9] Nixon-Schlossman syndrome       ED Disposition     ED Disposition   Discharge    Condition   Stable    Date/Time   Mon Feb 6, 2023  7:31 PM    Comment   Curt Sauceda discharge to home/self care                 Follow-up Information     Follow up With Specialties Details Why Contact Info Additional Information Ayala Malone MD Family Medicine Call  As needed [address] [facility]  [phone]       [facility] Emergency Medicine Go to  As needed, If symptoms worsen [address] Emergency Department, [address], [place] [address], [address] [facility], [address]    Your Ophthalmologist  Call  As needed, If symptoms worsen            Discharge Medication List as of 2/6/2023  7:36 PM      CONTINUE these medications which have NOT CHANGED    Details   acetaminophen (TYLENOL) 325 mg tablet Take 2 tablets (650 mg total) by mouth every 6 (six) hours as needed for mild pain or moderate pain, Starting Thu 6/30/2022, OTC      B Complex Vitamins (VITAMIN B COMPLEX PO) Take by mouth, Historical Med      brimonidine tartrate 0 2 % ophthalmic solution 1 drop, Historical Med      Cholecalciferol (VITAMIN D3) 5000 units CAPS Take 1 capsule by mouth daily, Historical Med      Denosumab (PROLIA SC) Inject under the skin, Historical Med      Dorzolamide HCl-Timolol Mal PF 22 3-6 8 MG/ML SOLN Apply to eye, Historical Med      Empagliflozin 25 MG TABS Take 1 5 tablets by mouth daily, Starting Wed 6/22/2022, Historical Med      GLUCOSAMINE-CHONDROITIN-MSM PO Take by mouth, Historical Med      ibuprofen (MOTRIN) 400 mg tablet Take by mouth, Historical Med      Levothyroxine Sodium (SYNTHROID PO) Take 0 05 mg by mouth, Historical Med      multivitamin (THERAGRAN) TABS Take 1 tablet by mouth daily, Historical Med      pantoprazole (PROTONIX) 40 mg tablet Take 1 tablet (40 mg total) by mouth every 12 (twelve) hours for 14 days, Starting Fri 11/4/2022, Until Fri 11/18/2022, Print           No discharge procedures on file  PDMP Review     None           ED Provider  Attending physically available and evaluated Paulette Villafana  SAROJ managed the patient along with the ED Attending      Electronically Signed by America Krabbe, DO  02/09/23 6513

## 2023-02-07 NOTE — DISCHARGE INSTRUCTIONS
You were seen in the evaluated in the emergency department for elevated eye pressure, eye irritation, and tearing  You were evaluated by myself, my attending and on-call ophthalmologist   On-call ophthalmologist by process of elimination thinks your symptoms are due to Nixon-Schlossman syndrome (sudden onset elevation in IOP accompanied by mild inflammation of the anterior chamber of the eye)  Please contact your primary care doctor schedule follow-up appointment  Please contact your ophthalmologist to schedule a follow-up appointment  Please take the medications prescribed by Dr Clare Kingston, the ophthalmologist who evaluated you in the emergency department  Please return the emergency department for new or worsening symptoms, including but not limited to worsening pain, headache, nausea, vomiting, or any new or worsening symptoms

## 2023-02-07 NOTE — CONSULTS
This 58-year-old presents complaining of pain and blurry vision with halos in the right eye for several weeks  Earlier today she was seen by an optometrist at the [facility]   She normally sees an ophthalmologist, Dr Ashtyn Thomas  She is treated as an ocular hypertensive with several glaucoma medications  On her eye exam at the [place] her pressure was noted to be markedly elevated in the right eye and she was referred for further management  On examination, visual acuity with correction at near is 20/30 OU  Pupils are equal round and reactive to light with no afferent defect  Extraocular movements are unrestricted  The external examination shows mild injection of both eyes  Both corneas show radial keratotomy scarring with some changes to the central cornea as well  Intraocular pressures are 46 and 22  The anterior chambers are deep in both eyes  I felt there was a mild flare in the right anterior chamber on slit lamp examination  Both angles were grade 4 and there was no neovascularization of the iris or in the posterior pole  Mild nuclear sclerotic cataracts noted in both eyes  The optic nerves are pink and flat with 0 25 physiologic cupping  The macula vessels and periphery are unremarkable in both eyes  Impression: Positive Schlossman syndrome, right eye  Plan: Add timolol 1/2% every morning to the right eye  Pred forte 1 drop every 5 minutes x2 now and every 1 hour while awake until seen in my office  Diamox 250 mg p o  now and then 4 times daily for 2 days  Discharge home  The patient will follow-up in my office tomorrow morning

## 2023-02-07 NOTE — ED NOTES
Pt discharge instructions reviewed by provider, Pt has no further questions at this time  Pt awake and alert, no signs of acute distress noted  Pt ambulated out of ED with a steady gait       Merlin Turcios RN  02/06/23 2026

## 2023-02-07 NOTE — ED ATTENDING ATTESTATION
2/6/2023  IPrerna MD, saw and evaluated the patient  I have discussed the patient with the resident/non-physician practitioner and agree with the resident's/non-physician practitioner's findings, Plan of Care, and MDM as documented in the resident's/non-physician practitioner's note, except where noted  All available labs and Radiology studies were reviewed  I was present for key portions of any procedure(s) performed by the resident/non-physician practitioner and I was immediately available to provide assistance  At this point I agree with the current assessment done in the Emergency Department  I have conducted an independent evaluation of this patient a history and physical is as follows:    ED Course         Critical Care Time  Procedures    Patient is a pleasant 71 yof who presents with eye irritation and vague halos  No f/c/s  Some blurry vision  No corneal abrasion  On multiple glaucoma medications  No focal neuro symptoms  MDM pleasant 71 yof, optho evaluated patient, suspectsSchlossman syndrome, will treat as such  PLAN PER OPTHO  This 71-year-old presents complaining of pain and blurry vision with halos in the right eye for several weeks  Earlier today she was seen by an optometrist at the [facility]   She normally sees an ophthalmologist, Dr Adolfo Ramirez  She is treated as an ocular hypertensive with several glaucoma medications  On [facility] at the [facility] her pressure was noted to be markedly elevated in the right eye and she was referred for further management      On examination, visual acuity with correction at near is 20/30 OU  Pupils are equal round and reactive to light with no afferent defect  Extraocular movements are unrestricted  The external examination shows mild injection of both eyes  Both corneas show radial keratotomy scarring with some changes to the central cornea as well  Intraocular pressures are 46 and 22    The anterior chambers are deep in both eyes  I felt there was a mild flare in the right anterior chamber on slit lamp examination  Both angles were grade 4 and there was no neovascularization of the iris or in the posterior pole  Mild nuclear sclerotic cataracts noted in both eyes  The optic nerves are pink and flat with 0 25 physiologic cupping  The macula vessels and periphery are unremarkable in both eyes      Impression: Positive Schlossman syndrome, right eye      Plan: Add timolol 1/2% every morning to the right eye  Pred forte 1 drop every 5 minutes x2 now and every 1 hour while awake until seen in my office  Diamox 250 mg p o  now and then 4 times daily for 2 days  Discharge home  The patient will follow-up in my office tomorrow morning

## 2023-02-09 NOTE — PROGRESS NOTES
Patient verified name and . Order for consent  found in EHR and matches case posting; patient verifies procedure. Type 2 surgery, Phone assessment complete. Orders  received. Labs per surgeon: none  Labs per anesthesia protocol: Hgb    Patient answered medical/surgical history questions at their best of ability. All prior to admission medications documented in [facility] Care. Patient instructed to take the following medications the day of surgery according to anesthesia guidelines with a small sip of water: none. On the day before surgery please take Acetaminophen 1000mg in the morning and then again before bed. You may substitute for Tylenol 650 mg. Hold all vitamins 7 days prior to surgery and NSAIDS 5 days prior to surgery. Prescription meds to hold: Advil. Patient instructed on the following:    > Arrive at [facility] [place], time of arrival to be called the day before by 1700  > NPO after midnight, unless otherwise indicated, including gum, mints, and ice chips  > Responsible adult must drive patient to the hospital, stay during surgery, and patient will need supervision 24 hours after anesthesia  > Use antibacterial Soap in shower the night before surgery and on the morning of surgery  > All piercings must be removed prior to arrival.    > Leave all valuables (money and jewelry) at home but bring insurance card and ID on DOS.   > You may be required to pay a deductible or co-pay on the day of your procedure. You can pre-pay by calling [phone] if your surgery is at the [facility] or [phone] if your surgery is at the [facility]. > Do not wear make-up, nail polish, lotions, cologne, perfumes, powders, or oil on skin. Artificial nails are not permitted. Subjective:       Demetrio Saini "Adrian Feasreymundo" presents to the clinic 3 weeks following a laparoscopic cholecystectomy  Eating a regular diet without difficulty  Bowel movements are Normal   The patient is not having any pain         Objective:      Temp (!) 96 3 °F (35 7 °C) (Core)   Ht 4' 10" (1 473 m)   Wt 72 4 kg (159 lb 9 6 oz)   BMI 33 36 kg/m²     General:  alert and oriented, in no acute distress   Abdomen: soft, non-tender   Incision:   healing well, no drainage, no erythema, no hernia, no seroma, no swelling, no dehiscence, incision well approximated       Assessment:      Doing well postoperatively  Discussed path results, pt expressed understanding of the results and has no questions at this time  Patient has been taking protonix for H pylori found incidentally by GI during her previous hospitalization  Currently has no symptoms such as nausea, reflux, or pain  Review of Systems   Constitutional: Negative for appetite change and fatigue  HENT: Negative  Eyes: Negative  Respiratory: Negative  Gastrointestinal: Negative for abdominal pain, constipation, nausea and vomiting  Endocrine: Negative  Genitourinary: Negative  Musculoskeletal: Negative  Skin: Negative  Neurological: Negative  Psychiatric/Behavioral: Negative  All other systems reviewed and are negative  Plan:      1  Continue any current medications  - Currently taking Protonix 40mg BID x3 months per GI for H  Pylori; advised f/u with PCP and GI  2  Wound care discussed  3  Pt is to increase activities as tolerated    4  Follow up as needed

## 2023-02-10 ENCOUNTER — HOSPITAL ENCOUNTER (OUTPATIENT)
Dept: RADIOLOGY | Age: 73
Discharge: HOME | End: 2023-02-10

## 2023-02-10 VITALS — HEIGHT: 58 IN | BODY MASS INDEX: 34.63 KG/M2 | WEIGHT: 165 LBS

## 2023-02-10 DIAGNOSIS — Z12.31 ENCOUNTER FOR SCREENING MAMMOGRAM FOR MALIGNANT NEOPLASM OF BREAST: ICD-10-CM

## 2024-02-21 PROBLEM — Z12.11 SCREENING FOR MALIGNANT NEOPLASM OF COLON: Status: RESOLVED | Noted: 2019-03-01 | Resolved: 2024-02-21

## 2024-03-08 ENCOUNTER — HOSPITAL ENCOUNTER (OUTPATIENT)
Dept: RADIOLOGY | Facility: IMAGING CENTER | Age: 74
End: 2024-03-08
Payer: COMMERCIAL

## 2024-03-08 VITALS — WEIGHT: 162 LBS | BODY MASS INDEX: 34 KG/M2 | HEIGHT: 58 IN

## 2024-03-08 DIAGNOSIS — Z12.31 ENCOUNTER FOR SCREENING MAMMOGRAM FOR MALIGNANT NEOPLASM OF BREAST: ICD-10-CM

## 2024-03-08 PROCEDURE — 77067 SCR MAMMO BI INCL CAD: CPT

## 2024-03-08 PROCEDURE — 77063 BREAST TOMOSYNTHESIS BI: CPT

## (undated) DEVICE — TROCAR: Brand: KII FIOS FIRST ENTRY

## (undated) DEVICE — PACK PBDS LAP CHOLE RF

## (undated) DEVICE — GLOVE SRG BIOGEL 5.5

## (undated) DEVICE — GLOVE INDICATOR UNDERGLOVE SZ 6 BLUE

## (undated) DEVICE — LIGAMAX 5 MM ENDOSCOPIC MULTIPLE CLIP APPLIER: Brand: LIGAMAX

## (undated) DEVICE — ENDOPATH 5MM ENDOSCOPIC BLUNT TIP DISSECTORS (12 POUCHES CONTAINING 3 DISSECTORS EACH): Brand: ENDOPATH

## (undated) DEVICE — TUBING SMOKE EVAC W/FILTRATION DEVICE PLUMEPORT ACTIV

## (undated) DEVICE — TROCAR: Brand: KII® SLEEVE

## (undated) DEVICE — PENCIL ELECTROSURG E-Z CLEAN -0035H

## (undated) DEVICE — ELECTRODE LAP J HOOK E-Z CLEAN 33CM-0021

## (undated) DEVICE — SUT VICRYL PLUS 0 UR-6 27IN VCP603H

## (undated) DEVICE — INTENDED FOR TISSUE SEPARATION, AND OTHER PROCEDURES THAT REQUIRE A SHARP SURGICAL BLADE TO PUNCTURE OR CUT.: Brand: BARD-PARKER SAFETY BLADES SIZE 11, STERILE

## (undated) DEVICE — ADHESIVE SKIN HIGH VISCOSITY EXOFIN 1ML

## (undated) DEVICE — CHLORAPREP HI-LITE 26ML ORANGE

## (undated) DEVICE — NEEDLE 25GA X 1 IN SAFETY GLIDE

## (undated) DEVICE — TISSUE RETRIEVAL SYSTEM: Brand: INZII RETRIEVAL SYSTEM

## (undated) DEVICE — SUT MONOCRYL 4-0 PS-2 18 IN Y496G

## (undated) DEVICE — TROCARS: Brand: KII® BALLOON BLUNT TIP SYSTEM